# Patient Record
Sex: MALE | Race: WHITE | NOT HISPANIC OR LATINO
[De-identification: names, ages, dates, MRNs, and addresses within clinical notes are randomized per-mention and may not be internally consistent; named-entity substitution may affect disease eponyms.]

---

## 2017-10-16 ENCOUNTER — APPOINTMENT (OUTPATIENT)
Dept: COLORECTAL SURGERY | Facility: CLINIC | Age: 62
End: 2017-10-16
Payer: COMMERCIAL

## 2017-10-16 VITALS
WEIGHT: 245 LBS | HEIGHT: 73 IN | RESPIRATION RATE: 12 BRPM | SYSTOLIC BLOOD PRESSURE: 132 MMHG | HEART RATE: 62 BPM | DIASTOLIC BLOOD PRESSURE: 90 MMHG | BODY MASS INDEX: 32.47 KG/M2

## 2017-10-16 DIAGNOSIS — H18.602 KERATOCONUS, UNSPECIFIED, LEFT EYE: ICD-10-CM

## 2017-10-16 DIAGNOSIS — Z86.69 PERSONAL HISTORY OF OTHER DISEASES OF THE NERVOUS SYSTEM AND SENSE ORGANS: ICD-10-CM

## 2017-10-16 DIAGNOSIS — Z86.010 PERSONAL HISTORY OF COLONIC POLYPS: ICD-10-CM

## 2017-10-16 DIAGNOSIS — K57.32 DIVERTICULITIS OF LARGE INTESTINE W/OUT PERFORATION OR ABSCESS W/OUT BLEEDING: ICD-10-CM

## 2017-10-16 DIAGNOSIS — Z80.1 FAMILY HISTORY OF MALIGNANT NEOPLASM OF TRACHEA, BRONCHUS AND LUNG: ICD-10-CM

## 2017-10-16 DIAGNOSIS — Z78.9 OTHER SPECIFIED HEALTH STATUS: ICD-10-CM

## 2017-10-16 DIAGNOSIS — Z86.718 PERSONAL HISTORY OF OTHER VENOUS THROMBOSIS AND EMBOLISM: ICD-10-CM

## 2017-10-16 DIAGNOSIS — Z80.0 FAMILY HISTORY OF MALIGNANT NEOPLASM OF DIGESTIVE ORGANS: ICD-10-CM

## 2017-10-16 PROCEDURE — 99243 OFF/OP CNSLTJ NEW/EST LOW 30: CPT

## 2017-10-16 RX ORDER — MODAFINIL 100 MG/1
100 TABLET ORAL
Refills: 0 | Status: ACTIVE | COMMUNITY

## 2017-10-16 RX ORDER — IBUPROFEN AND FAMOTIDINE 800; 26.6 MG/1; MG/1
800-26.6 TABLET, COATED ORAL
Refills: 0 | Status: ACTIVE | COMMUNITY

## 2017-11-10 ENCOUNTER — OUTPATIENT (OUTPATIENT)
Dept: OUTPATIENT SERVICES | Facility: HOSPITAL | Age: 62
LOS: 1 days | End: 2017-11-10
Payer: COMMERCIAL

## 2017-11-10 VITALS
DIASTOLIC BLOOD PRESSURE: 89 MMHG | SYSTOLIC BLOOD PRESSURE: 138 MMHG | TEMPERATURE: 98 F | WEIGHT: 242.51 LBS | HEART RATE: 64 BPM | OXYGEN SATURATION: 99 % | RESPIRATION RATE: 14 BRPM | HEIGHT: 73 IN

## 2017-11-10 DIAGNOSIS — G47.33 OBSTRUCTIVE SLEEP APNEA (ADULT) (PEDIATRIC): ICD-10-CM

## 2017-11-10 DIAGNOSIS — K57.32 DIVERTICULITIS OF LARGE INTESTINE WITHOUT PERFORATION OR ABSCESS WITHOUT BLEEDING: ICD-10-CM

## 2017-11-10 DIAGNOSIS — Z94.7 CORNEAL TRANSPLANT STATUS: Chronic | ICD-10-CM

## 2017-11-10 DIAGNOSIS — S42.302A UNSPECIFIED FRACTURE OF SHAFT OF HUMERUS, LEFT ARM, INITIAL ENCOUNTER FOR CLOSED FRACTURE: Chronic | ICD-10-CM

## 2017-11-10 DIAGNOSIS — Z01.818 ENCOUNTER FOR OTHER PREPROCEDURAL EXAMINATION: ICD-10-CM

## 2017-11-10 LAB
ANION GAP SERPL CALC-SCNC: 16 MMOL/L — SIGNIFICANT CHANGE UP (ref 5–17)
BLD GP AB SCN SERPL QL: NEGATIVE — SIGNIFICANT CHANGE UP
BUN SERPL-MCNC: 13 MG/DL — SIGNIFICANT CHANGE UP (ref 7–23)
CALCIUM SERPL-MCNC: 9.7 MG/DL — SIGNIFICANT CHANGE UP (ref 8.4–10.5)
CHLORIDE SERPL-SCNC: 100 MMOL/L — SIGNIFICANT CHANGE UP (ref 96–108)
CO2 SERPL-SCNC: 24 MMOL/L — SIGNIFICANT CHANGE UP (ref 22–31)
CREAT SERPL-MCNC: 0.86 MG/DL — SIGNIFICANT CHANGE UP (ref 0.5–1.3)
GLUCOSE SERPL-MCNC: 91 MG/DL — SIGNIFICANT CHANGE UP (ref 70–99)
HBA1C BLD-MCNC: 5.5 % — SIGNIFICANT CHANGE UP (ref 4–5.6)
HCT VFR BLD CALC: 41.6 % — SIGNIFICANT CHANGE UP (ref 39–50)
HGB BLD-MCNC: 14.2 G/DL — SIGNIFICANT CHANGE UP (ref 13–17)
MCHC RBC-ENTMCNC: 31.7 PG — SIGNIFICANT CHANGE UP (ref 27–34)
MCHC RBC-ENTMCNC: 34.1 GM/DL — SIGNIFICANT CHANGE UP (ref 32–36)
MCV RBC AUTO: 92.9 FL — SIGNIFICANT CHANGE UP (ref 80–100)
PLATELET # BLD AUTO: 171 K/UL — SIGNIFICANT CHANGE UP (ref 150–400)
POTASSIUM SERPL-MCNC: 4.4 MMOL/L — SIGNIFICANT CHANGE UP (ref 3.5–5.3)
POTASSIUM SERPL-SCNC: 4.4 MMOL/L — SIGNIFICANT CHANGE UP (ref 3.5–5.3)
RBC # BLD: 4.48 M/UL — SIGNIFICANT CHANGE UP (ref 4.2–5.8)
RBC # FLD: 13.7 % — SIGNIFICANT CHANGE UP (ref 10.3–14.5)
RH IG SCN BLD-IMP: POSITIVE — SIGNIFICANT CHANGE UP
SODIUM SERPL-SCNC: 140 MMOL/L — SIGNIFICANT CHANGE UP (ref 135–145)
WBC # BLD: 5.7 K/UL — SIGNIFICANT CHANGE UP (ref 3.8–10.5)
WBC # FLD AUTO: 5.7 K/UL — SIGNIFICANT CHANGE UP (ref 3.8–10.5)

## 2017-11-10 PROCEDURE — 86901 BLOOD TYPING SEROLOGIC RH(D): CPT

## 2017-11-10 PROCEDURE — 86850 RBC ANTIBODY SCREEN: CPT

## 2017-11-10 PROCEDURE — 86900 BLOOD TYPING SEROLOGIC ABO: CPT

## 2017-11-10 PROCEDURE — G0463: CPT

## 2017-11-10 PROCEDURE — 80048 BASIC METABOLIC PNL TOTAL CA: CPT

## 2017-11-10 PROCEDURE — 85027 COMPLETE CBC AUTOMATED: CPT

## 2017-11-10 PROCEDURE — 87086 URINE CULTURE/COLONY COUNT: CPT

## 2017-11-10 PROCEDURE — 83036 HEMOGLOBIN GLYCOSYLATED A1C: CPT

## 2017-11-10 NOTE — H&P PST ADULT - PSH
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
## 2017-11-11 LAB
CULTURE RESULTS: NO GROWTH — SIGNIFICANT CHANGE UP
SPECIMEN SOURCE: SIGNIFICANT CHANGE UP

## 2017-11-14 ENCOUNTER — APPOINTMENT (OUTPATIENT)
Dept: COLORECTAL SURGERY | Facility: HOSPITAL | Age: 62
End: 2017-11-14

## 2017-11-16 ENCOUNTER — INPATIENT (INPATIENT)
Facility: HOSPITAL | Age: 62
LOS: 2 days | Discharge: HOME | DRG: 331 | End: 2017-11-19
Attending: SURGERY | Admitting: SURGERY
Payer: COMMERCIAL

## 2017-11-16 ENCOUNTER — TRANSCRIPTION ENCOUNTER (OUTPATIENT)
Age: 62
End: 2017-11-16

## 2017-11-16 ENCOUNTER — APPOINTMENT (OUTPATIENT)
Dept: COLORECTAL SURGERY | Facility: HOSPITAL | Age: 62
End: 2017-11-16
Payer: COMMERCIAL

## 2017-11-16 ENCOUNTER — RESULT REVIEW (OUTPATIENT)
Age: 62
End: 2017-11-16

## 2017-11-16 VITALS
RESPIRATION RATE: 18 BRPM | TEMPERATURE: 98 F | WEIGHT: 242.95 LBS | DIASTOLIC BLOOD PRESSURE: 82 MMHG | HEART RATE: 72 BPM | SYSTOLIC BLOOD PRESSURE: 116 MMHG | HEIGHT: 73 IN | OXYGEN SATURATION: 98 %

## 2017-11-16 DIAGNOSIS — Z01.818 ENCOUNTER FOR OTHER PREPROCEDURAL EXAMINATION: ICD-10-CM

## 2017-11-16 DIAGNOSIS — S42.302A UNSPECIFIED FRACTURE OF SHAFT OF HUMERUS, LEFT ARM, INITIAL ENCOUNTER FOR CLOSED FRACTURE: Chronic | ICD-10-CM

## 2017-11-16 DIAGNOSIS — Z94.7 CORNEAL TRANSPLANT STATUS: Chronic | ICD-10-CM

## 2017-11-16 LAB
GLUCOSE BLDC GLUCOMTR-MCNC: 91 MG/DL — SIGNIFICANT CHANGE UP (ref 70–99)
RH IG SCN BLD-IMP: POSITIVE — SIGNIFICANT CHANGE UP

## 2017-11-16 PROCEDURE — 88307 TISSUE EXAM BY PATHOLOGIST: CPT | Mod: 26

## 2017-11-16 PROCEDURE — 44207 L COLECTOMY/COLOPROCTOSTOMY: CPT

## 2017-11-16 PROCEDURE — 45300 PROCTOSIGMOIDOSCOPY DX: CPT

## 2017-11-16 RX ORDER — ACETAMINOPHEN 500 MG
1000 TABLET ORAL ONCE
Qty: 0 | Refills: 0 | Status: COMPLETED | OUTPATIENT
Start: 2017-11-16 | End: 2017-11-16

## 2017-11-16 RX ORDER — LIDOCAINE HCL 20 MG/ML
0.2 VIAL (ML) INJECTION ONCE
Qty: 0 | Refills: 0 | Status: DISCONTINUED | OUTPATIENT
Start: 2017-11-16 | End: 2017-11-16

## 2017-11-16 RX ORDER — ACETAMINOPHEN 500 MG
1000 TABLET ORAL ONCE
Qty: 0 | Refills: 0 | Status: COMPLETED | OUTPATIENT
Start: 2017-11-17 | End: 2017-11-17

## 2017-11-16 RX ORDER — ENOXAPARIN SODIUM 100 MG/ML
40 INJECTION SUBCUTANEOUS EVERY 24 HOURS
Qty: 0 | Refills: 0 | Status: DISCONTINUED | OUTPATIENT
Start: 2017-11-16 | End: 2017-11-19

## 2017-11-16 RX ORDER — INFLUENZA VIRUS VACCINE 15; 15; 15; 15 UG/.5ML; UG/.5ML; UG/.5ML; UG/.5ML
0.5 SUSPENSION INTRAMUSCULAR ONCE
Qty: 0 | Refills: 0 | Status: COMPLETED | OUTPATIENT
Start: 2017-11-16 | End: 2017-11-16

## 2017-11-16 RX ORDER — HEPARIN SODIUM 5000 [USP'U]/ML
5000 INJECTION INTRAVENOUS; SUBCUTANEOUS ONCE
Qty: 0 | Refills: 0 | Status: COMPLETED | OUTPATIENT
Start: 2017-11-16 | End: 2017-11-16

## 2017-11-16 RX ORDER — SODIUM CHLORIDE 9 MG/ML
1000 INJECTION, SOLUTION INTRAVENOUS
Qty: 0 | Refills: 0 | Status: DISCONTINUED | OUTPATIENT
Start: 2017-11-16 | End: 2017-11-17

## 2017-11-16 RX ORDER — SODIUM CHLORIDE 9 MG/ML
3 INJECTION INTRAMUSCULAR; INTRAVENOUS; SUBCUTANEOUS EVERY 8 HOURS
Qty: 0 | Refills: 0 | Status: DISCONTINUED | OUTPATIENT
Start: 2017-11-16 | End: 2017-11-16

## 2017-11-16 RX ORDER — ONDANSETRON 8 MG/1
4 TABLET, FILM COATED ORAL ONCE
Qty: 0 | Refills: 0 | Status: DISCONTINUED | OUTPATIENT
Start: 2017-11-16 | End: 2017-11-16

## 2017-11-16 RX ORDER — HYDROMORPHONE HYDROCHLORIDE 2 MG/ML
0.5 INJECTION INTRAMUSCULAR; INTRAVENOUS; SUBCUTANEOUS
Qty: 0 | Refills: 0 | Status: DISCONTINUED | OUTPATIENT
Start: 2017-11-16 | End: 2017-11-16

## 2017-11-16 RX ORDER — NALOXONE HYDROCHLORIDE 4 MG/.1ML
0.1 SPRAY NASAL
Qty: 0 | Refills: 0 | Status: DISCONTINUED | OUTPATIENT
Start: 2017-11-16 | End: 2017-11-19

## 2017-11-16 RX ORDER — MODAFINIL 200 MG/1
1 TABLET ORAL
Qty: 0 | Refills: 0 | COMMUNITY

## 2017-11-16 RX ORDER — HEPARIN SODIUM 5000 [USP'U]/ML
5000 INJECTION INTRAVENOUS; SUBCUTANEOUS ONCE
Qty: 0 | Refills: 0 | Status: DISCONTINUED | OUTPATIENT
Start: 2017-11-16 | End: 2017-11-16

## 2017-11-16 RX ORDER — ONDANSETRON 8 MG/1
4 TABLET, FILM COATED ORAL EVERY 6 HOURS
Qty: 0 | Refills: 0 | Status: DISCONTINUED | OUTPATIENT
Start: 2017-11-16 | End: 2017-11-19

## 2017-11-16 RX ORDER — HYDROMORPHONE HYDROCHLORIDE 2 MG/ML
0.5 INJECTION INTRAMUSCULAR; INTRAVENOUS; SUBCUTANEOUS
Qty: 0 | Refills: 0 | Status: DISCONTINUED | OUTPATIENT
Start: 2017-11-16 | End: 2017-11-19

## 2017-11-16 RX ORDER — HYDROMORPHONE HYDROCHLORIDE 2 MG/ML
30 INJECTION INTRAMUSCULAR; INTRAVENOUS; SUBCUTANEOUS
Qty: 0 | Refills: 0 | Status: DISCONTINUED | OUTPATIENT
Start: 2017-11-16 | End: 2017-11-19

## 2017-11-16 RX ADMIN — HYDROMORPHONE HYDROCHLORIDE 30 MILLILITER(S): 2 INJECTION INTRAMUSCULAR; INTRAVENOUS; SUBCUTANEOUS at 21:01

## 2017-11-16 RX ADMIN — HEPARIN SODIUM 5000 UNIT(S): 5000 INJECTION INTRAVENOUS; SUBCUTANEOUS at 06:05

## 2017-11-16 RX ADMIN — HYDROMORPHONE HYDROCHLORIDE 0.5 MILLIGRAM(S): 2 INJECTION INTRAMUSCULAR; INTRAVENOUS; SUBCUTANEOUS at 13:00

## 2017-11-16 RX ADMIN — HYDROMORPHONE HYDROCHLORIDE 30 MILLILITER(S): 2 INJECTION INTRAMUSCULAR; INTRAVENOUS; SUBCUTANEOUS at 16:08

## 2017-11-16 RX ADMIN — Medication 1000 MILLIGRAM(S): at 20:00

## 2017-11-16 RX ADMIN — HYDROMORPHONE HYDROCHLORIDE 30 MILLILITER(S): 2 INJECTION INTRAMUSCULAR; INTRAVENOUS; SUBCUTANEOUS at 14:42

## 2017-11-16 RX ADMIN — ENOXAPARIN SODIUM 40 MILLIGRAM(S): 100 INJECTION SUBCUTANEOUS at 21:29

## 2017-11-16 RX ADMIN — SODIUM CHLORIDE 100 MILLILITER(S): 9 INJECTION, SOLUTION INTRAVENOUS at 14:19

## 2017-11-16 RX ADMIN — Medication 400 MILLIGRAM(S): at 18:31

## 2017-11-16 RX ADMIN — SODIUM CHLORIDE 3 MILLILITER(S): 9 INJECTION INTRAMUSCULAR; INTRAVENOUS; SUBCUTANEOUS at 14:19

## 2017-11-16 RX ADMIN — HYDROMORPHONE HYDROCHLORIDE 0.5 MILLIGRAM(S): 2 INJECTION INTRAMUSCULAR; INTRAVENOUS; SUBCUTANEOUS at 12:45

## 2017-11-16 RX ADMIN — HYDROMORPHONE HYDROCHLORIDE 30 MILLILITER(S): 2 INJECTION INTRAMUSCULAR; INTRAVENOUS; SUBCUTANEOUS at 20:01
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
## 2017-11-16 NOTE — PATIENT PROFILE ADULT. - PSH
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

## 2017-11-16 NOTE — CHART NOTE - NSCHARTNOTEFT_GEN_A_CORE
S: Patient underwent Laparoscopic sigmoidectomy and tolerated procedure without  issue and sent to PACU.  Patient denies chest pain, shortness of breath, nausea, vomiting, lightheadedness, or dizziness.  Pain was well controlled with PCA.    O:    T(C): 36.6 (11-16-17 @ 16:00), Max: 36.9 (11-16-17 @ 12:25)  HR: 77 (11-16-17 @ 16:00) (52 - 77)  BP: 131/88 (11-16-17 @ 16:00) (110/73 - 141/87)  RR: 16 (11-16-17 @ 16:00) (12 - 16)  SpO2: 99% (11-16-17 @ 16:00) (97% - 100%)    Gen: NAD  Abd: Soft, nontender, nondistended. Dressings c/d/i. No palpable masses    Assessment/Plan:  62y Male s/p Laparoscopic sigmoidectomy    Pain control  Reg Diet  DVT PPX  Out of bed and encourage early ambulation  Incentive spirometry S: Patient underwent Laparoscopic sigmoidectomy and tolerated procedure without  issue and sent to PACU.  Patient denies chest pain, shortness of breath, nausea, vomiting, lightheadedness, or dizziness.  Pain was well controlled with PCA.    O:    T(C): 36.6 (11-16-17 @ 16:00), Max: 36.9 (11-16-17 @ 12:25)  HR: 77 (11-16-17 @ 16:00) (52 - 77)  BP: 131/88 (11-16-17 @ 16:00) (110/73 - 141/87)  RR: 16 (11-16-17 @ 16:00) (12 - 16)  SpO2: 99% (11-16-17 @ 16:00) (97% - 100%)    Gen: NAD  Abd: Soft, nontender, nondistended. Dressings c/d/i. No palpable masses    Assessment/Plan:  62y Male s/p Laparoscopic sigmoidectomy    Pain control  NPO  DVT PPX  Out of bed and encourage early ambulation  Incentive spirometry

## 2017-11-16 NOTE — BRIEF OPERATIVE NOTE - PROCEDURE
<<-----Click on this checkbox to enter Procedure Laparoscopic sigmoidectomy  11/16/2017    Active  [id number]

## 2017-11-17 LAB
ANION GAP SERPL CALC-SCNC: 12 MMOL/L — SIGNIFICANT CHANGE UP (ref 5–17)
APPEARANCE UR: ABNORMAL
BASOPHILS # BLD AUTO: 0.01 K/UL — SIGNIFICANT CHANGE UP (ref 0–0.2)
BASOPHILS NFR BLD AUTO: 0.1 % — SIGNIFICANT CHANGE UP (ref 0–2)
BILIRUB UR-MCNC: NEGATIVE — SIGNIFICANT CHANGE UP
BUN SERPL-MCNC: 10 MG/DL — SIGNIFICANT CHANGE UP (ref 7–23)
CALCIUM SERPL-MCNC: 8.4 MG/DL — SIGNIFICANT CHANGE UP (ref 8.4–10.5)
CHLORIDE SERPL-SCNC: 101 MMOL/L — SIGNIFICANT CHANGE UP (ref 96–108)
CO2 SERPL-SCNC: 25 MMOL/L — SIGNIFICANT CHANGE UP (ref 22–31)
COLOR SPEC: ABNORMAL
CREAT SERPL-MCNC: 0.79 MG/DL — SIGNIFICANT CHANGE UP (ref 0.5–1.3)
DIFF PNL FLD: ABNORMAL
EOSINOPHIL # BLD AUTO: 0 K/UL — SIGNIFICANT CHANGE UP (ref 0–0.5)
EOSINOPHIL NFR BLD AUTO: 0 % — SIGNIFICANT CHANGE UP (ref 0–6)
GLUCOSE SERPL-MCNC: 105 MG/DL — HIGH (ref 70–99)
GLUCOSE UR QL: NEGATIVE — SIGNIFICANT CHANGE UP
HCT VFR BLD CALC: 37.3 % — LOW (ref 39–50)
HGB BLD-MCNC: 12.2 G/DL — LOW (ref 13–17)
IMM GRANULOCYTES NFR BLD AUTO: 0.2 % — SIGNIFICANT CHANGE UP (ref 0–1.5)
KETONES UR-MCNC: ABNORMAL
LEUKOCYTE ESTERASE UR-ACNC: ABNORMAL
LYMPHOCYTES # BLD AUTO: 1.1 K/UL — SIGNIFICANT CHANGE UP (ref 1–3.3)
LYMPHOCYTES # BLD AUTO: 10.7 % — LOW (ref 13–44)
MCHC RBC-ENTMCNC: 30.4 PG — SIGNIFICANT CHANGE UP (ref 27–34)
MCHC RBC-ENTMCNC: 32.7 GM/DL — SIGNIFICANT CHANGE UP (ref 32–36)
MCV RBC AUTO: 93 FL — SIGNIFICANT CHANGE UP (ref 80–100)
MONOCYTES # BLD AUTO: 0.92 K/UL — HIGH (ref 0–0.9)
MONOCYTES NFR BLD AUTO: 8.9 % — SIGNIFICANT CHANGE UP (ref 2–14)
NEUTROPHILS # BLD AUTO: 8.24 K/UL — HIGH (ref 1.8–7.4)
NEUTROPHILS NFR BLD AUTO: 80.1 % — HIGH (ref 43–77)
NITRITE UR-MCNC: NEGATIVE — SIGNIFICANT CHANGE UP
PH UR: 6 — SIGNIFICANT CHANGE UP (ref 5–8)
PLATELET # BLD AUTO: 150 K/UL — SIGNIFICANT CHANGE UP (ref 150–400)
POTASSIUM SERPL-MCNC: 4.2 MMOL/L — SIGNIFICANT CHANGE UP (ref 3.5–5.3)
POTASSIUM SERPL-SCNC: 4.2 MMOL/L — SIGNIFICANT CHANGE UP (ref 3.5–5.3)
PROT UR-MCNC: 100 MG/DL
RBC # BLD: 4.01 M/UL — LOW (ref 4.2–5.8)
RBC # FLD: 13.7 % — SIGNIFICANT CHANGE UP (ref 10.3–14.5)
RBC CASTS # UR COMP ASSIST: >50 /HPF (ref 0–2)
SODIUM SERPL-SCNC: 138 MMOL/L — SIGNIFICANT CHANGE UP (ref 135–145)
SP GR SPEC: 1.02 — SIGNIFICANT CHANGE UP (ref 1.01–1.02)
UROBILINOGEN FLD QL: NEGATIVE — SIGNIFICANT CHANGE UP
WBC # BLD: 10.29 K/UL — SIGNIFICANT CHANGE UP (ref 3.8–10.5)
WBC # FLD AUTO: 10.29 K/UL — SIGNIFICANT CHANGE UP (ref 3.8–10.5)
WBC UR QL: ABNORMAL /HPF (ref 0–5)

## 2017-11-17 RX ORDER — DEXTROSE MONOHYDRATE, SODIUM CHLORIDE, AND POTASSIUM CHLORIDE 50; .745; 4.5 G/1000ML; G/1000ML; G/1000ML
1000 INJECTION, SOLUTION INTRAVENOUS
Qty: 0 | Refills: 0 | Status: DISCONTINUED | OUTPATIENT
Start: 2017-11-17 | End: 2017-11-19

## 2017-11-17 RX ORDER — SODIUM CHLORIDE 9 MG/ML
1000 INJECTION, SOLUTION INTRAVENOUS
Qty: 0 | Refills: 0 | Status: DISCONTINUED | OUTPATIENT
Start: 2017-11-17 | End: 2017-11-17

## 2017-11-17 RX ADMIN — Medication 400 MILLIGRAM(S): at 05:13

## 2017-11-17 RX ADMIN — Medication 400 MILLIGRAM(S): at 12:37

## 2017-11-17 RX ADMIN — SODIUM CHLORIDE 100 MILLILITER(S): 9 INJECTION, SOLUTION INTRAVENOUS at 05:14

## 2017-11-17 RX ADMIN — Medication 1000 MILLIGRAM(S): at 05:43

## 2017-11-17 RX ADMIN — HYDROMORPHONE HYDROCHLORIDE 30 MILLILITER(S): 2 INJECTION INTRAMUSCULAR; INTRAVENOUS; SUBCUTANEOUS at 07:29

## 2017-11-17 RX ADMIN — HYDROMORPHONE HYDROCHLORIDE 30 MILLILITER(S): 2 INJECTION INTRAMUSCULAR; INTRAVENOUS; SUBCUTANEOUS at 19:13

## 2017-11-17 RX ADMIN — ENOXAPARIN SODIUM 40 MILLIGRAM(S): 100 INJECTION SUBCUTANEOUS at 21:07

## 2017-11-17 RX ADMIN — Medication 1000 MILLIGRAM(S): at 12:37

## 2017-11-17 NOTE — PROGRESS NOTE ADULT - ASSESSMENT
Assessment:    62y Male who presents s/p lap sigmoidectomy     Plan:  -DVT PPX- Lovenox  -Pain control-PCA  -OOB as tolerated with assistance   -Advance diet as tolerated, with noted GI function   -Await/monitor Gi Fxn    -Wean supplemental oxygen   -remove ucath nabila jeffries am, AM   #[phone] 11-17-17 @ 06:54

## 2017-11-18 LAB
ANION GAP SERPL CALC-SCNC: 11 MMOL/L — SIGNIFICANT CHANGE UP (ref 5–17)
BUN SERPL-MCNC: 12 MG/DL — SIGNIFICANT CHANGE UP (ref 7–23)
CALCIUM SERPL-MCNC: 8.3 MG/DL — LOW (ref 8.4–10.5)
CHLORIDE SERPL-SCNC: 101 MMOL/L — SIGNIFICANT CHANGE UP (ref 96–108)
CO2 SERPL-SCNC: 26 MMOL/L — SIGNIFICANT CHANGE UP (ref 22–31)
CREAT SERPL-MCNC: 0.77 MG/DL — SIGNIFICANT CHANGE UP (ref 0.5–1.3)
CULTURE RESULTS: NO GROWTH — SIGNIFICANT CHANGE UP
GLUCOSE SERPL-MCNC: 111 MG/DL — HIGH (ref 70–99)
HCT VFR BLD CALC: 36 % — LOW (ref 39–50)
HGB BLD-MCNC: 11.9 G/DL — LOW (ref 13–17)
MAGNESIUM SERPL-MCNC: 2 MG/DL — SIGNIFICANT CHANGE UP (ref 1.6–2.6)
MCHC RBC-ENTMCNC: 31.2 PG — SIGNIFICANT CHANGE UP (ref 27–34)
MCHC RBC-ENTMCNC: 33.1 GM/DL — SIGNIFICANT CHANGE UP (ref 32–36)
MCV RBC AUTO: 94.2 FL — SIGNIFICANT CHANGE UP (ref 80–100)
PHOSPHATE SERPL-MCNC: 1.5 MG/DL — LOW (ref 2.5–4.5)
PLATELET # BLD AUTO: 141 K/UL — LOW (ref 150–400)
POTASSIUM SERPL-MCNC: 3.7 MMOL/L — SIGNIFICANT CHANGE UP (ref 3.5–5.3)
POTASSIUM SERPL-SCNC: 3.7 MMOL/L — SIGNIFICANT CHANGE UP (ref 3.5–5.3)
RBC # BLD: 3.82 M/UL — LOW (ref 4.2–5.8)
RBC # FLD: 13.8 % — SIGNIFICANT CHANGE UP (ref 10.3–14.5)
SODIUM SERPL-SCNC: 138 MMOL/L — SIGNIFICANT CHANGE UP (ref 135–145)
SPECIMEN SOURCE: SIGNIFICANT CHANGE UP
WBC # BLD: 9.48 K/UL — SIGNIFICANT CHANGE UP (ref 3.8–10.5)
WBC # FLD AUTO: 9.48 K/UL — SIGNIFICANT CHANGE UP (ref 3.8–10.5)

## 2017-11-18 RX ORDER — POTASSIUM CHLORIDE 20 MEQ
10 PACKET (EA) ORAL ONCE
Qty: 0 | Refills: 0 | Status: COMPLETED | OUTPATIENT
Start: 2017-11-18 | End: 2017-11-18

## 2017-11-18 RX ORDER — POTASSIUM PHOSPHATE, MONOBASIC POTASSIUM PHOSPHATE, DIBASIC 236; 224 MG/ML; MG/ML
15 INJECTION, SOLUTION INTRAVENOUS ONCE
Qty: 0 | Refills: 0 | Status: COMPLETED | OUTPATIENT
Start: 2017-11-18 | End: 2017-11-18

## 2017-11-18 RX ADMIN — DEXTROSE MONOHYDRATE, SODIUM CHLORIDE, AND POTASSIUM CHLORIDE 75 MILLILITER(S): 50; .745; 4.5 INJECTION, SOLUTION INTRAVENOUS at 15:53

## 2017-11-18 RX ADMIN — ENOXAPARIN SODIUM 40 MILLIGRAM(S): 100 INJECTION SUBCUTANEOUS at 21:02

## 2017-11-18 RX ADMIN — HYDROMORPHONE HYDROCHLORIDE 30 MILLILITER(S): 2 INJECTION INTRAMUSCULAR; INTRAVENOUS; SUBCUTANEOUS at 19:20

## 2017-11-18 RX ADMIN — POTASSIUM PHOSPHATE, MONOBASIC POTASSIUM PHOSPHATE, DIBASIC 62.5 MILLIMOLE(S): 236; 224 INJECTION, SOLUTION INTRAVENOUS at 17:53

## 2017-11-18 RX ADMIN — Medication 100 MILLIEQUIVALENT(S): at 15:49

## 2017-11-18 RX ADMIN — HYDROMORPHONE HYDROCHLORIDE 30 MILLILITER(S): 2 INJECTION INTRAMUSCULAR; INTRAVENOUS; SUBCUTANEOUS at 07:23

## 2017-11-18 NOTE — PROGRESS NOTE ADULT - ASSESSMENT
Assessment:    62y Male who presents s/p lap sigmoidectomy     Plan:  -DVT PPX- Lovenox  -Pain control-PCA  -OOB as tolerated with assistance   -Advance diet as tolerated, with noted GI function   -Await/monitor Gi Fxn    -Wean supplemental oxygen   -Jame GRANT'd, DTV @ 15:30

## 2017-11-19 ENCOUNTER — TRANSCRIPTION ENCOUNTER (OUTPATIENT)
Age: 62
End: 2017-11-19

## 2017-11-19 VITALS
RESPIRATION RATE: 18 BRPM | TEMPERATURE: 98 F | SYSTOLIC BLOOD PRESSURE: 120 MMHG | HEART RATE: 75 BPM | OXYGEN SATURATION: 98 % | DIASTOLIC BLOOD PRESSURE: 82 MMHG

## 2017-11-19 LAB
ANION GAP SERPL CALC-SCNC: 12 MMOL/L — SIGNIFICANT CHANGE UP (ref 5–17)
BUN SERPL-MCNC: 8 MG/DL — SIGNIFICANT CHANGE UP (ref 7–23)
CALCIUM SERPL-MCNC: 9 MG/DL — SIGNIFICANT CHANGE UP (ref 8.4–10.5)
CHLORIDE SERPL-SCNC: 103 MMOL/L — SIGNIFICANT CHANGE UP (ref 96–108)
CO2 SERPL-SCNC: 23 MMOL/L — SIGNIFICANT CHANGE UP (ref 22–31)
CREAT SERPL-MCNC: 0.88 MG/DL — SIGNIFICANT CHANGE UP (ref 0.5–1.3)
GLUCOSE SERPL-MCNC: 97 MG/DL — SIGNIFICANT CHANGE UP (ref 70–99)
HCT VFR BLD CALC: 36.7 % — LOW (ref 39–50)
HGB BLD-MCNC: 12.4 G/DL — LOW (ref 13–17)
MAGNESIUM SERPL-MCNC: 1.8 MG/DL — SIGNIFICANT CHANGE UP (ref 1.6–2.6)
MCHC RBC-ENTMCNC: 31.6 PG — SIGNIFICANT CHANGE UP (ref 27–34)
MCHC RBC-ENTMCNC: 33.8 GM/DL — SIGNIFICANT CHANGE UP (ref 32–36)
MCV RBC AUTO: 93.4 FL — SIGNIFICANT CHANGE UP (ref 80–100)
PHOSPHATE SERPL-MCNC: 2 MG/DL — LOW (ref 2.5–4.5)
PLATELET # BLD AUTO: 144 K/UL — LOW (ref 150–400)
POTASSIUM SERPL-MCNC: 3.8 MMOL/L — SIGNIFICANT CHANGE UP (ref 3.5–5.3)
POTASSIUM SERPL-SCNC: 3.8 MMOL/L — SIGNIFICANT CHANGE UP (ref 3.5–5.3)
RBC # BLD: 3.93 M/UL — LOW (ref 4.2–5.8)
RBC # FLD: 13.4 % — SIGNIFICANT CHANGE UP (ref 10.3–14.5)
SODIUM SERPL-SCNC: 138 MMOL/L — SIGNIFICANT CHANGE UP (ref 135–145)
WBC # BLD: 8.66 K/UL — SIGNIFICANT CHANGE UP (ref 3.8–10.5)
WBC # FLD AUTO: 8.66 K/UL — SIGNIFICANT CHANGE UP (ref 3.8–10.5)

## 2017-11-19 PROCEDURE — C1758: CPT

## 2017-11-19 PROCEDURE — 80048 BASIC METABOLIC PNL TOTAL CA: CPT

## 2017-11-19 PROCEDURE — 87086 URINE CULTURE/COLONY COUNT: CPT

## 2017-11-19 PROCEDURE — C1889: CPT

## 2017-11-19 PROCEDURE — 85027 COMPLETE CBC AUTOMATED: CPT

## 2017-11-19 PROCEDURE — 88307 TISSUE EXAM BY PATHOLOGIST: CPT

## 2017-11-19 PROCEDURE — 84100 ASSAY OF PHOSPHORUS: CPT

## 2017-11-19 PROCEDURE — 86901 BLOOD TYPING SEROLOGIC RH(D): CPT

## 2017-11-19 PROCEDURE — 81001 URINALYSIS AUTO W/SCOPE: CPT

## 2017-11-19 PROCEDURE — 86900 BLOOD TYPING SEROLOGIC ABO: CPT

## 2017-11-19 PROCEDURE — 82962 GLUCOSE BLOOD TEST: CPT

## 2017-11-19 PROCEDURE — 83735 ASSAY OF MAGNESIUM: CPT

## 2017-11-19 RX ORDER — SODIUM,POTASSIUM PHOSPHATES 278-250MG
1 POWDER IN PACKET (EA) ORAL
Qty: 8 | Refills: 0 | OUTPATIENT
Start: 2017-11-19 | End: 2017-11-21

## 2017-11-19 RX ORDER — MAGNESIUM SULFATE 500 MG/ML
2 VIAL (ML) INJECTION ONCE
Qty: 0 | Refills: 0 | Status: COMPLETED | OUTPATIENT
Start: 2017-11-19 | End: 2017-11-19

## 2017-11-19 RX ORDER — SODIUM CHLORIDE 9 MG/ML
3 INJECTION INTRAMUSCULAR; INTRAVENOUS; SUBCUTANEOUS EVERY 8 HOURS
Qty: 0 | Refills: 0 | Status: DISCONTINUED | OUTPATIENT
Start: 2017-11-19 | End: 2017-11-19

## 2017-11-19 RX ORDER — POTASSIUM PHOSPHATE, MONOBASIC POTASSIUM PHOSPHATE, DIBASIC 236; 224 MG/ML; MG/ML
15 INJECTION, SOLUTION INTRAVENOUS ONCE
Qty: 0 | Refills: 0 | Status: COMPLETED | OUTPATIENT
Start: 2017-11-19 | End: 2017-11-19

## 2017-11-19 RX ORDER — SODIUM,POTASSIUM PHOSPHATES 278-250MG
1 POWDER IN PACKET (EA) ORAL
Qty: 0 | Refills: 0 | Status: DISCONTINUED | OUTPATIENT
Start: 2017-11-19 | End: 2017-11-19

## 2017-11-19 RX ADMIN — SODIUM CHLORIDE 3 MILLILITER(S): 9 INJECTION INTRAMUSCULAR; INTRAVENOUS; SUBCUTANEOUS at 13:15

## 2017-11-19 RX ADMIN — DEXTROSE MONOHYDRATE, SODIUM CHLORIDE, AND POTASSIUM CHLORIDE 50 MILLILITER(S): 50; .745; 4.5 INJECTION, SOLUTION INTRAVENOUS at 05:50

## 2017-11-19 RX ADMIN — HYDROMORPHONE HYDROCHLORIDE 30 MILLILITER(S): 2 INJECTION INTRAMUSCULAR; INTRAVENOUS; SUBCUTANEOUS at 07:28

## 2017-11-19 RX ADMIN — Medication 1 TABLET(S): at 12:18

## 2017-11-19 RX ADMIN — Medication 50 GRAM(S): at 12:18

## 2017-11-19 RX ADMIN — POTASSIUM PHOSPHATE, MONOBASIC POTASSIUM PHOSPHATE, DIBASIC 62.5 MILLIMOLE(S): 236; 224 INJECTION, SOLUTION INTRAVENOUS at 13:25

## 2017-11-19 NOTE — DISCHARGE NOTE ADULT - HOSPITAL COURSE
Patient was admitted and underwent laparoscopic sigmoidectomy on 11-16-17. Patient was medically optimized and improved clinically throughout hospital stay. Patient seen and examined on day of discharge.    Vital Signs Last 24 Hrs  T(C): 36.9 (19 Nov 2017 13:00), Max: 37.2 (18 Nov 2017 22:20)  T(F): 98.5 (19 Nov 2017 13:00), Max: 98.9 (18 Nov 2017 22:20)  HR: 75 (19 Nov 2017 13:00) (62 - 86)  BP: 120/82 (19 Nov 2017 13:00) (107/75 - 138/89)  BP(mean): --  RR: 18 (19 Nov 2017 13:00) (18 - 18)  SpO2: 98% (19 Nov 2017 13:00) (94% - 98%)    Physical Exam:  General: Well developed, well nourished, No Acute Distress  HEENT: Normocephallic Atraumatic, EOMI bl  Neurology: AA&Ox3  Abdominal: Soft, Non-Tender, Non-Distended  Extremities: No Clubbing, cyanosis or edema  Skin: warm, dry, normal color, no rash or abnormal lesions    Patient is medically stable and cleared for discharge to home with outpatient follow up.

## 2017-11-19 NOTE — PROGRESS NOTE ADULT - SUBJECTIVE AND OBJECTIVE BOX
GENERAL SURGERY DAILY PROGRESS NOTE:     Subjective: Patient seen and examined. Patient stable with no overnight events. Patient denies CP/ SOB/ Palpatations. Patient denies N/V. Reports No flatus and No BM. Patient reports pain is well controlled     MEDICATIONS  (STANDING):  acetaminophen  IVPB. 1000 milliGRAM(s) IV Intermittent once  dextrose 5% + sodium chloride 0.45%. 1000 milliLiter(s) (75 mL/Hr) IV Continuous <Continuous>  enoxaparin Injectable 40 milliGRAM(s) SubCutaneous every 24 hours  HYDROmorphone PCA (1 mG/mL) 30 milliLiter(s) PCA Continuous PCA Continuous  influenza   Vaccine 0.5 milliLiter(s) IntraMuscular once    MEDICATIONS  (PRN):  HYDROmorphone PCA (1 mG/mL) Rescue Clinician Bolus 0.5 milliGRAM(s) IV Push every 15 minutes PRN for Pain Scale GREATER THAN 6  naloxone Injectable 0.1 milliGRAM(s) IV Push every 3 minutes PRN For ANY of the following changes in patient status:  A. RR LESS THAN 10 breaths per minute, B. Oxygen saturation LESS THAN 90%, C. Sedation score of 6  ondansetron Injectable 4 milliGRAM(s) IV Push every 6 hours PRN Nausea    Vital Signs Last 24 Hrs  T(C): 37.1 (17 Nov 2017 05:23), Max: 37.1 (17 Nov 2017 05:23)  T(F): 98.7 (17 Nov 2017 05:23), Max: 98.7 (17 Nov 2017 05:23)  HR: 67 (17 Nov 2017 05:23) (52 - 85)  BP: 115/76 (17 Nov 2017 05:23) (107/75 - 141/87)  BP(mean): 108 (16 Nov 2017 18:00) (87 - 108)  RR: 18 (17 Nov 2017 05:23) (12 - 18)  SpO2: 97% (17 Nov 2017 05:23) (94% - 100%)    I&O's Detail  16 Nov 2017 07:01  -  17 Nov 2017 06:54  --------------------------------------------------------  IN:    IV PiggyBack: 100 mL    lactated ringers.: 2000 mL  Total IN: 2100 mL  OUT:    Indwelling Catheter - Urethral: 2550 mL  Total OUT: 2550 mL  Total NET: -450 mL    LABS:  Pending       Urinalysis Basic - ( 17 Nov 2017 01:40 )  Color: x / Appearance: x / SG: x / pH: x  Gluc: x / Ketone: x  / Bili: x / Urobili: x   Blood: x / Protein: x / Nitrite: x   Leuk Esterase: x / RBC: >50 /HPF / WBC 5-10 /HPF   Sq Epi: x / Non Sq Epi: x / Bacteria: x    Physical Exam:   Gen: NAD, AAOx3  Abd: soft, Non- tender, non- distended   clean/ dry/ intact
Day 1 of Anesthesia Pain Management Service    SUBJECTIVE: Patient is doing well with IV PCA    Pain Scale Score:	[X] Refer to charted pain scores    THERAPY:    [ ] IV PCA Morphine		[ ] 5 mg/mL	[ ] 1 mg/mL  [X] IV PCA Hydromorphone	[ ] 5 mg/mL	[X] 1 mg/mL  [ ] IV PCA Fentanyl		[ ] 50 micrograms/mL    Demand dose: 0.2 mg     Lockout: 6 minutes   Continuous Rate: 0 mg/hr  4 Hour Limit: 4 mg    MEDICATIONS  (STANDING):  acetaminophen  IVPB. 1000 milliGRAM(s) IV Intermittent once  dextrose 5% + sodium chloride 0.45%. 1000 milliLiter(s) (75 mL/Hr) IV Continuous <Continuous>  enoxaparin Injectable 40 milliGRAM(s) SubCutaneous every 24 hours  HYDROmorphone PCA (1 mG/mL) 30 milliLiter(s) PCA Continuous PCA Continuous  influenza   Vaccine 0.5 milliLiter(s) IntraMuscular once    MEDICATIONS  (PRN):  HYDROmorphone PCA (1 mG/mL) Rescue Clinician Bolus 0.5 milliGRAM(s) IV Push every 15 minutes PRN for Pain Scale GREATER THAN 6  naloxone Injectable 0.1 milliGRAM(s) IV Push every 3 minutes PRN For ANY of the following changes in patient status:  A. RR LESS THAN 10 breaths per minute, B. Oxygen saturation LESS THAN 90%, C. Sedation score of 6  ondansetron Injectable 4 milliGRAM(s) IV Push every 6 hours PRN Nausea      OBJECTIVE:    Sedation Score:	[ X] Alert	[ ] Drowsy 	[ ] Arousable	[ ] Asleep	[ ] Unresponsive    Side Effects:	[X ] None	[ ] Nausea	[ ] Vomiting	[ ] Pruritus  		[ ] Other:    Vital Signs Last 24 Hrs  T(C): 36.8 (17 Nov 2017 08:58), Max: 37.1 (17 Nov 2017 05:23)  T(F): 98.2 (17 Nov 2017 08:58), Max: 98.7 (17 Nov 2017 05:23)  HR: 60 (17 Nov 2017 08:58) (52 - 85)  BP: 106/69 (17 Nov 2017 08:58) (106/69 - 141/87)  BP(mean): 108 (16 Nov 2017 18:00) (87 - 108)  RR: 18 (17 Nov 2017 08:58) (12 - 18)  SpO2: 96% (17 Nov 2017 08:58) (94% - 100%)    ASSESSMENT/ PLAN    Therapy to  be:               [X] Continued   [ ] Discontinued   [ ] Changed to PRN Analgesics    Documentation and Verification of current medications:   [X] Done	[ ] Not done, not eligible    Comments: Reeducated to use
Day _2__ of Anesthesia Pain Management Service    SUBJECTIVE:    Pain Scale Score	At rest: ___ 	With Activity: ___ 	[ x] Refer to charted pain scores    THERAPY:    [ ] IV PCA Morphine		[ ] 5 mg/mL	[ ] 1 mg/mL  [x ] IV PCA Hydromorphone	[ ] 5 mg/mL	[x ] 1 mg/mL  [ ] IV PCA Fentanyl		[ ] 50 micrograms/mL    Demand dose   0.2 lockout  6  (minutes)  0Continuous Rate         MEDICATIONS  (STANDING):  dextrose 5% + sodium chloride 0.45% with potassium chloride 20 mEq/L 1000 milliLiter(s) (75 mL/Hr) IV Continuous <Continuous>  enoxaparin Injectable 40 milliGRAM(s) SubCutaneous every 24 hours  HYDROmorphone PCA (1 mG/mL) 30 milliLiter(s) PCA Continuous PCA Continuous  influenza   Vaccine 0.5 milliLiter(s) IntraMuscular once    MEDICATIONS  (PRN):  HYDROmorphone PCA (1 mG/mL) Rescue Clinician Bolus 0.5 milliGRAM(s) IV Push every 15 minutes PRN for Pain Scale GREATER THAN 6  naloxone Injectable 0.1 milliGRAM(s) IV Push every 3 minutes PRN For ANY of the following changes in patient status:  A. RR LESS THAN 10 breaths per minute, B. Oxygen saturation LESS THAN 90%, C. Sedation score of 6  ondansetron Injectable 4 milliGRAM(s) IV Push every 6 hours PRN Nausea      OBJECTIVE:    Sedation Score:	[ x] Alert	[ ] Drowsy 	[ ] Arousable	[ ] Asleep	[ ] Unresponsive    Side Effects:	[x ] None	[ ] Nausea	[ ] Vomiting	[ ] Pruritus  		[ ] Other:    Vital Signs Last 24 Hrs  T(C): 36.7 (18 Nov 2017 08:52), Max: 37.3 (18 Nov 2017 01:22)  T(F): 98 (18 Nov 2017 08:52), Max: 99.1 (18 Nov 2017 01:22)  HR: 74 (18 Nov 2017 08:52) (69 - 76)  BP: 116/77 (18 Nov 2017 08:52) (98/65 - 120/79)  BP(mean): --  RR: 18 (18 Nov 2017 08:52) (18 - 18)  SpO2: 95% (18 Nov 2017 08:52) (93% - 97%)    ASSESSMENT/ PLAN    Therapy to  be:	[ x] Continue   [ ] Discontinued   [ ] Change to prn Analgesics    Documentation and Verification of current medications:   [X] Done	[ ] Not done, not elligible    Comments:I was physically present for the key portoins of the evaluation and management service provided, I agree with the above history and physical, and plan which I have reviewed and edited where appropriate
Day _3__ of Anesthesia Pain Management Service    SUBJECTIVE:    Pain Scale Score	At rest: ___ 	With Activity: ___ 	[ x] Refer to charted pain scores    THERAPY:    [ ] IV PCA Morphine		[ ] 5 mg/mL	[ ] 1 mg/mL  [x ] IV PCA Hydromorphone	[ ] 5 mg/mL	[x ] 1 mg/mL  [ ] IV PCA Fentanyl		[ ] 50 micrograms/mL    Demand dose   0.2 lockout  6  (minutes)  0Continuous Rate         MEDICATIONS  (STANDING):  enoxaparin Injectable 40 milliGRAM(s) SubCutaneous every 24 hours  influenza   Vaccine 0.5 milliLiter(s) IntraMuscular once  potassium acid phosphate/sodium acid phosphate tablet (K-PHOS No. 2) 1 Tablet(s) Oral four times a day with meals  potassium phosphate IVPB 15 milliMole(s) IV Intermittent once  sodium chloride 0.9% lock flush 3 milliLiter(s) IV Push every 8 hours    MEDICATIONS  (PRN):      OBJECTIVE:    Sedation Score:	[ x] Alert	[ ] Drowsy 	[ ] Arousable	[ ] Asleep	[ ] Unresponsive    Side Effects:	[x ] None	[ ] Nausea	[ ] Vomiting	[ ] Pruritus  		[ ] Other:    Vital Signs Last 24 Hrs  T(C): 37.1 (19 Nov 2017 09:03), Max: 37.2 (18 Nov 2017 22:20)  T(F): 98.7 (19 Nov 2017 09:03), Max: 98.9 (18 Nov 2017 22:20)  HR: 86 (19 Nov 2017 09:03) (62 - 86)  BP: 115/80 (19 Nov 2017 09:03) (107/75 - 138/89)  BP(mean): --  RR: 18 (19 Nov 2017 09:03) (18 - 18)  SpO2: 97% (19 Nov 2017 09:03) (94% - 97%)    ASSESSMENT/ PLAN    Therapy to  be:	[ ] Continue   [ ] Discontinued   [ ] Change to prn Analgesics    Documentation and Verification of current medications:   [X] Done	[ ] Not done, not elligible    Comments:I was physically present for the key portoins of the evaluation and management service provided, I agree with the above history and physical, and plan which I have reviewed and edited where appropriate
Red Team Progress Note. Please page #[phone] with any questions or concerns.    S: 63yo Man seen and examined during Red team morning rounds. No acute events overnight; afebrile, VS stable. Pain well controlled with current regimen. Tolerating diet; denies N/V. Reports passing flatus and having bowel movements.     O:  Vital Signs Last 24 Hrs  T(C): 36.7 (18 Nov 2017 08:52), Max: 37.3 (18 Nov 2017 01:22)  T(F): 98 (18 Nov 2017 08:52), Max: 99.1 (18 Nov 2017 01:22)  HR: 74 (18 Nov 2017 08:52) (69 - 76)  BP: 116/77 (18 Nov 2017 08:52) (98/65 - 120/79)  BP(mean): --  RR: 18 (18 Nov 2017 08:52) (18 - 18)  SpO2: 95% (18 Nov 2017 08:52) (93% - 97%)    I&O's Detail    17 Nov 2017 07:01  -  18 Nov 2017 07:00  --------------------------------------------------------  IN:    dextrose 5% + sodium chloride 0.45% with potassium chloride 20 mEq/L: 1750 mL  Total IN: 1750 mL    OUT:    Indwelling Catheter - Urethral: 1650 mL  Total OUT: 1650 mL    Total NET: 100 mL    Physical Exam:  Gen: Resting in bed, NAD, alert and oriented  Resp: airway patent, respirations unlabored, no increased WOB   Abd: soft, NT/ND, wound c/d/i    MEDICATIONS  (STANDING):  dextrose 5% + sodium chloride 0.45% with potassium chloride 20 mEq/L 1000 milliLiter(s) (75 mL/Hr) IV Continuous <Continuous>  enoxaparin Injectable 40 milliGRAM(s) SubCutaneous every 24 hours  HYDROmorphone PCA (1 mG/mL) 30 milliLiter(s) PCA Continuous PCA Continuous  influenza   Vaccine 0.5 milliLiter(s) IntraMuscular once    MEDICATIONS  (PRN):  HYDROmorphone PCA (1 mG/mL) Rescue Clinician Bolus 0.5 milliGRAM(s) IV Push every 15 minutes PRN for Pain Scale GREATER THAN 6  naloxone Injectable 0.1 milliGRAM(s) IV Push every 3 minutes PRN For ANY of the following changes in patient status:  A. RR LESS THAN 10 breaths per minute, B. Oxygen saturation LESS THAN 90%, C. Sedation score of 6  ondansetron Injectable 4 milliGRAM(s) IV Push every 6 hours PRN Nausea      LABS:                        11.9   9.48  )-----------( 141      ( 18 Nov 2017 08:34 )             36.0       11-18    138  |  101  |  12  ----------------------------<  111<H>  3.7   |  26  |  0.77    Ca    8.3<L>      18 Nov 2017 08:40  Phos  1.5     11-18  Mg     2.0     11-18      Negative 11-17-17 @ 01:04  --   11-17-17 @ 04:57   No growth
Red Team Progress Note. Please page #[phone] with any questions or concerns.    S: 63yo Man seen and examined during Red team morning rounds. No acute events overnight; afebrile, VS stable. Pain well controlled with current regimen. Tolerating diet; denies N/V. Reports passing flatus and having bowel movements.     O:  Vital Signs Last 24 Hrs  T(C): 36.9 (19 Nov 2017 01:35), Max: 37.2 (18 Nov 2017 22:20)  T(F): 98.5 (19 Nov 2017 01:35), Max: 98.9 (18 Nov 2017 22:20)  HR: 62 (19 Nov 2017 01:35) (62 - 80)  BP: 122/81 (19 Nov 2017 01:35) (107/75 - 122/81)  BP(mean): --  RR: 18 (19 Nov 2017 01:35) (18 - 18)  SpO2: 94% (19 Nov 2017 01:35) (93% - 96%)    I&O's Detail    17 Nov 2017 07:01  -  18 Nov 2017 07:00  --------------------------------------------------------  IN:    dextrose 5% + sodium chloride 0.45% with potassium chloride 20 mEq/L: 1750 mL  Total IN: 1750 mL    OUT:    Indwelling Catheter - Urethral: 1650 mL  Total OUT: 1650 mL    Total NET: 100 mL      18 Nov 2017 07:01  -  19 Nov 2017 05:01  --------------------------------------------------------  IN:    dextrose 5% + sodium chloride 0.45% with potassium chloride 20 mEq/L: 900 mL    IV PiggyBack: 350 mL  Total IN: 1250 mL    OUT:    Indwelling Catheter - Urethral: 600 mL    Voided: 2450 mL  Total OUT: 3050 mL    Total NET: -1800 mL          Physical Exam:  Gen: Resting in bed, NAD, alert and oriented  Resp: airway patent, respirations unlabored, no increased WOB   Abd: soft, NT/ND    MEDICATIONS  (STANDING):  dextrose 5% + sodium chloride 0.45% with potassium chloride 20 mEq/L 1000 milliLiter(s) (50 mL/Hr) IV Continuous <Continuous>  enoxaparin Injectable 40 milliGRAM(s) SubCutaneous every 24 hours  HYDROmorphone PCA (1 mG/mL) 30 milliLiter(s) PCA Continuous PCA Continuous  influenza   Vaccine 0.5 milliLiter(s) IntraMuscular once    MEDICATIONS  (PRN):  HYDROmorphone PCA (1 mG/mL) Rescue Clinician Bolus 0.5 milliGRAM(s) IV Push every 15 minutes PRN for Pain Scale GREATER THAN 6  naloxone Injectable 0.1 milliGRAM(s) IV Push every 3 minutes PRN For ANY of the following changes in patient status:  A. RR LESS THAN 10 breaths per minute, B. Oxygen saturation LESS THAN 90%, C. Sedation score of 6  ondansetron Injectable 4 milliGRAM(s) IV Push every 6 hours PRN Nausea      LABS:                        11.9   9.48  )-----------( 141      ( 18 Nov 2017 08:34 )             36.0       11-18    138  |  101  |  12  ----------------------------<  111<H>  3.7   |  26  |  0.77    Ca    8.3<L>      18 Nov 2017 08:40  Phos  1.5     11-18  Mg     2.0     11-18      Negative 11-17-17 @ 01:04      --   11-17-17 @ 04:57   No growth
Pain Management Attending Addendum    SUBJECTIVE: Patient doing well with PCEA    Therapy:    [X] PCEA    OBJECTIVE:   [X] Pain appropriately controlled    [ ] Other:    Side Effects:  [X] None	             [ ] Nausea              [ ] Pruritis        [ ] Weakness          [ ] Numbness        	[ ] Other:    ASSESSMENT/PLAN:    [X] Continue current therapy    [ ] Therapy changed to:    [ ] PRN Analgesics   [ ] IV PCA    Comments:

## 2017-11-19 NOTE — DISCHARGE NOTE ADULT - MEDICATION SUMMARY - MEDICATIONS TO TAKE
I will START or STAY ON the medications listed below when I get home from the hospital:    supplement: OPE-3 antioxidant  -- Indication: For continue taking as previously indicated    Provigil 100 mg oral tablet  -- 1 tab(s) by mouth once a day (in the morning)  -- Indication: For continue taking as previously indicated    potassium phosphate-sodium phosphate 305 mg-700 mg oral tablet  -- 1 tab(s) by mouth 4 times a day (with meals and at bedtime)  -- Indication: For hypophosphatemia

## 2017-11-19 NOTE — DISCHARGE NOTE ADULT - PATIENT PORTAL LINK FT
“You can access the [facility] Patient Portal, offered by [facility], by registering with the following website: [email or web address]”

## 2017-11-19 NOTE — PROGRESS NOTE ADULT - ASSESSMENT
Assessment:    62y Male who presents s/p lap sigmoidectomy on POD 3.    Plan:  -DVT PPX- Lovenox  -OOB as tolerated with assistance   -Await/monitor Gi Fxn      -Diet: advance to LRD  -Pain control w/ PO meds, DC the PCA  -DC supplemental oxygen   -DC home tonight or tomorrow AM Assessment:    62y Male who presents s/p lap sigmoidectomy on POD 3.    Plan:  -DVT PPX- Lovenox  -OOB as tolerated with assistance   -Await/monitor Gi Fxn    -Diet: advance to LRD  -Pain control w/ PO meds, DC the PCA  -DC supplemental oxygen   -DC home today

## 2017-11-19 NOTE — DISCHARGE NOTE ADULT - CARE PLAN
Principal Discharge DX:	Diverticulitis of large intestine without perforation or abscess without bleeding  Goal:	return to activities of daily living  Instructions for follow-up, activity and diet:	Activity: Resume activities of daily living. Do not lift heavy weights (greater than 15 pounds) or engage in strenuous exercise until you see your doctor in the office in 1-2 weeks. You may shower tomorrow, just let the water run over the wound, no scrubbing. No immersion baths or swimming in pools or ocean until you see us in the office again.  Diet: Resume your regular diet  Follow up: Please make an appointment with Dr. Birmingham in 1-2 weeks. Please find contact information on this page.

## 2017-11-19 NOTE — DISCHARGE NOTE ADULT - CARE PROVIDER_API CALL
Wisam Birmingham), ColonRectal Surgery; Surgery  [address]  [address]  [place] [address]  Phone: [phone]  Fax: [phone]

## 2017-11-19 NOTE — DISCHARGE NOTE ADULT - PLAN OF CARE
return to activities of daily living Activity: Resume activities of daily living. Do not lift heavy weights (greater than 15 pounds) or engage in strenuous exercise until you see your doctor in the office in 1-2 weeks. You may shower tomorrow, just let the water run over the wound, no scrubbing. No immersion baths or swimming in pools or ocean until you see us in the office again.  Diet: Resume your regular diet  Follow up: Please make an appointment with Dr. Birmingham in 1-2 weeks. Please find contact information on this page.

## 2017-11-22 LAB — SURGICAL PATHOLOGY STUDY: SIGNIFICANT CHANGE UP

## 2017-11-29 ENCOUNTER — APPOINTMENT (OUTPATIENT)
Dept: COLORECTAL SURGERY | Facility: CLINIC | Age: 62
End: 2017-11-29
Payer: COMMERCIAL

## 2017-11-29 PROCEDURE — 99024 POSTOP FOLLOW-UP VISIT: CPT

## 2017-12-13 ENCOUNTER — APPOINTMENT (OUTPATIENT)
Dept: COLORECTAL SURGERY | Facility: CLINIC | Age: 62
End: 2017-12-13
Payer: COMMERCIAL

## 2017-12-13 PROCEDURE — 99024 POSTOP FOLLOW-UP VISIT: CPT

## 2018-01-08 ENCOUNTER — APPOINTMENT (OUTPATIENT)
Dept: COLORECTAL SURGERY | Facility: CLINIC | Age: 63
End: 2018-01-08
Payer: COMMERCIAL

## 2018-01-08 PROCEDURE — 45330 DIAGNOSTIC SIGMOIDOSCOPY: CPT | Mod: 58

## 2018-01-08 RX ORDER — NEOMYCIN SULFATE 500 MG/1
500 TABLET ORAL
Qty: 6 | Refills: 0 | Status: DISCONTINUED | COMMUNITY
Start: 2017-10-17 | End: 2018-01-08

## 2018-01-08 RX ORDER — METRONIDAZOLE 500 MG/1
500 TABLET ORAL
Qty: 3 | Refills: 0 | Status: DISCONTINUED | COMMUNITY
Start: 2017-10-17 | End: 2018-01-08

## 2018-07-23 PROBLEM — Z78.9 ALCOHOL USE: Status: ACTIVE | Noted: 2017-10-16

## 2019-03-20 ENCOUNTER — INPATIENT (INPATIENT)
Facility: HOSPITAL | Age: 64
LOS: 5 days | Discharge: HOME | DRG: 511 | End: 2019-03-26
Attending: SURGERY | Admitting: SURGERY
Payer: COMMERCIAL

## 2019-03-20 ENCOUNTER — TRANSCRIPTION ENCOUNTER (OUTPATIENT)
Age: 64
End: 2019-03-20

## 2019-03-20 VITALS
HEART RATE: 80 BPM | SYSTOLIC BLOOD PRESSURE: 148 MMHG | DIASTOLIC BLOOD PRESSURE: 100 MMHG | OXYGEN SATURATION: 98 % | RESPIRATION RATE: 16 BRPM

## 2019-03-20 DIAGNOSIS — S42.302A UNSPECIFIED FRACTURE OF SHAFT OF HUMERUS, LEFT ARM, INITIAL ENCOUNTER FOR CLOSED FRACTURE: Chronic | ICD-10-CM

## 2019-03-20 DIAGNOSIS — Z94.7 CORNEAL TRANSPLANT STATUS: Chronic | ICD-10-CM

## 2019-03-20 LAB
ALBUMIN SERPL ELPH-MCNC: 4 G/DL — SIGNIFICANT CHANGE UP (ref 3.3–5)
ALP SERPL-CCNC: 71 U/L — SIGNIFICANT CHANGE UP (ref 40–120)
ALT FLD-CCNC: 104 U/L — HIGH (ref 10–45)
ANION GAP SERPL CALC-SCNC: 14 MMOL/L — SIGNIFICANT CHANGE UP (ref 5–17)
APTT BLD: 24.6 SEC — LOW (ref 27.5–36.3)
AST SERPL-CCNC: 123 U/L — HIGH (ref 10–40)
BASOPHILS # BLD AUTO: 0.1 K/UL — SIGNIFICANT CHANGE UP (ref 0–0.2)
BASOPHILS NFR BLD AUTO: 0.8 % — SIGNIFICANT CHANGE UP (ref 0–2)
BILIRUB SERPL-MCNC: 0.3 MG/DL — SIGNIFICANT CHANGE UP (ref 0.2–1.2)
BUN SERPL-MCNC: 21 MG/DL — SIGNIFICANT CHANGE UP (ref 7–23)
CALCIUM SERPL-MCNC: 8.9 MG/DL — SIGNIFICANT CHANGE UP (ref 8.4–10.5)
CHLORIDE SERPL-SCNC: 103 MMOL/L — SIGNIFICANT CHANGE UP (ref 96–108)
CK SERPL-CCNC: 170 U/L — SIGNIFICANT CHANGE UP (ref 30–200)
CO2 SERPL-SCNC: 24 MMOL/L — SIGNIFICANT CHANGE UP (ref 22–31)
CREAT SERPL-MCNC: 1.02 MG/DL — SIGNIFICANT CHANGE UP (ref 0.5–1.3)
EOSINOPHIL # BLD AUTO: 0.3 K/UL — SIGNIFICANT CHANGE UP (ref 0–0.5)
EOSINOPHIL NFR BLD AUTO: 3.4 % — SIGNIFICANT CHANGE UP (ref 0–6)
ETHANOL SERPL-MCNC: SIGNIFICANT CHANGE UP MG/DL (ref 0–10)
GAS PNL BLDV: SIGNIFICANT CHANGE UP
GLUCOSE SERPL-MCNC: 158 MG/DL — HIGH (ref 70–99)
HCT VFR BLD CALC: 40.3 % — SIGNIFICANT CHANGE UP (ref 39–50)
HGB BLD-MCNC: 13.3 G/DL — SIGNIFICANT CHANGE UP (ref 13–17)
INR BLD: 0.97 RATIO — SIGNIFICANT CHANGE UP (ref 0.88–1.16)
LIDOCAIN IGE QN: 23 U/L — SIGNIFICANT CHANGE UP (ref 7–60)
LYMPHOCYTES # BLD AUTO: 4.9 K/UL — HIGH (ref 1–3.3)
LYMPHOCYTES # BLD AUTO: 49.3 % — HIGH (ref 13–44)
MCHC RBC-ENTMCNC: 30.9 PG — SIGNIFICANT CHANGE UP (ref 27–34)
MCHC RBC-ENTMCNC: 33 GM/DL — SIGNIFICANT CHANGE UP (ref 32–36)
MCV RBC AUTO: 93.7 FL — SIGNIFICANT CHANGE UP (ref 80–100)
MONOCYTES # BLD AUTO: 0.8 K/UL — SIGNIFICANT CHANGE UP (ref 0–0.9)
MONOCYTES NFR BLD AUTO: 7.9 % — SIGNIFICANT CHANGE UP (ref 2–14)
NEUTROPHILS # BLD AUTO: 3.8 K/UL — SIGNIFICANT CHANGE UP (ref 1.8–7.4)
NEUTROPHILS NFR BLD AUTO: 38.6 % — LOW (ref 43–77)
PLATELET # BLD AUTO: 160 K/UL — SIGNIFICANT CHANGE UP (ref 150–400)
POTASSIUM SERPL-MCNC: 3.4 MMOL/L — LOW (ref 3.5–5.3)
POTASSIUM SERPL-SCNC: 3.4 MMOL/L — LOW (ref 3.5–5.3)
PROT SERPL-MCNC: 6.8 G/DL — SIGNIFICANT CHANGE UP (ref 6–8.3)
PROTHROM AB SERPL-ACNC: 11.2 SEC — SIGNIFICANT CHANGE UP (ref 10–12.9)
RBC # BLD: 4.3 M/UL — SIGNIFICANT CHANGE UP (ref 4.2–5.8)
RBC # FLD: 12.9 % — SIGNIFICANT CHANGE UP (ref 10.3–14.5)
SODIUM SERPL-SCNC: 141 MMOL/L — SIGNIFICANT CHANGE UP (ref 135–145)
WBC # BLD: 9.9 K/UL — SIGNIFICANT CHANGE UP (ref 3.8–10.5)
WBC # FLD AUTO: 9.9 K/UL — SIGNIFICANT CHANGE UP (ref 3.8–10.5)

## 2019-03-20 PROCEDURE — 70450 CT HEAD/BRAIN W/O DYE: CPT | Mod: 26

## 2019-03-20 PROCEDURE — 71260 CT THORAX DX C+: CPT | Mod: 26

## 2019-03-20 PROCEDURE — 74177 CT ABD & PELVIS W/CONTRAST: CPT | Mod: 26

## 2019-03-20 PROCEDURE — 76377 3D RENDER W/INTRP POSTPROCES: CPT | Mod: 26

## 2019-03-20 PROCEDURE — 72170 X-RAY EXAM OF PELVIS: CPT | Mod: 26

## 2019-03-20 PROCEDURE — 99285 EMERGENCY DEPT VISIT HI MDM: CPT | Mod: 25

## 2019-03-20 PROCEDURE — 71045 X-RAY EXAM CHEST 1 VIEW: CPT | Mod: 26

## 2019-03-20 PROCEDURE — 70486 CT MAXILLOFACIAL W/O DYE: CPT | Mod: 26

## 2019-03-20 PROCEDURE — 72125 CT NECK SPINE W/O DYE: CPT | Mod: 26

## 2019-03-20 RX ORDER — FENTANYL CITRATE 50 UG/ML
25 INJECTION INTRAVENOUS ONCE
Qty: 0 | Refills: 0 | Status: DISCONTINUED | OUTPATIENT
Start: 2019-03-20 | End: 2019-03-20

## 2019-03-20 RX ORDER — TETANUS TOXOID, REDUCED DIPHTHERIA TOXOID AND ACELLULAR PERTUSSIS VACCINE, ADSORBED 5; 2.5; 8; 8; 2.5 [IU]/.5ML; [IU]/.5ML; UG/.5ML; UG/.5ML; UG/.5ML
0.5 SUSPENSION INTRAMUSCULAR ONCE
Qty: 0 | Refills: 0 | Status: COMPLETED | OUTPATIENT
Start: 2019-03-20 | End: 2019-03-20

## 2019-03-20 RX ORDER — SODIUM CHLORIDE 9 MG/ML
1000 INJECTION INTRAMUSCULAR; INTRAVENOUS; SUBCUTANEOUS ONCE
Qty: 0 | Refills: 0 | Status: COMPLETED | OUTPATIENT
Start: 2019-03-20 | End: 2019-03-20

## 2019-03-20 RX ADMIN — SODIUM CHLORIDE 1000 MILLILITER(S): 9 INJECTION INTRAMUSCULAR; INTRAVENOUS; SUBCUTANEOUS at 22:51

## 2019-03-20 RX ADMIN — TETANUS TOXOID, REDUCED DIPHTHERIA TOXOID AND ACELLULAR PERTUSSIS VACCINE, ADSORBED 0.5 MILLILITER(S): 5; 2.5; 8; 8; 2.5 SUSPENSION INTRAMUSCULAR at 22:50

## 2019-03-20 RX ADMIN — FENTANYL CITRATE 25 MICROGRAM(S): 50 INJECTION INTRAVENOUS at 22:34
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
## 2019-03-20 NOTE — CONSULT NOTE ADULT - SUBJECTIVE AND OBJECTIVE BOX
62yo m pmh dvt no longer on any AC bibems from home after fall from roof 20 ft. 63 year old male presenting as Level II trauma activation following fall backwards off a roof. Patient reports he was working on his friend's roof, trying to address a leak when he fell off the roof, approximately 20 feet, and seems to have struck his face on the bottom of the ladder, however, he had (+) LOC and is uncertain of the details of the event. He was brought in by EMS with bilateral upper extremity splints and c-collar in place. Patient is reporting pain in both arms and his chest. Patient scheduled for Open reduction and internal fixation of  Patient is a 63y old  Male who presents with a chief complaint of Level II - fall off roof (21 Mar 2019 13:41)      PAST MEDICAL & SURGICAL HISTORY:  Diverticulitis of large intestine without perforation or abscess without bleeding  DVT (deep venous thrombosis): LLE 2012, treated with coumadin x 6 months.  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 650 milliGRAM(s) Oral every 6 hours  ceFAZolin   IVPB 2000 milliGRAM(s) IV Intermittent every 8 hours  docusate sodium 100 milliGRAM(s) Oral three times a day  enoxaparin Injectable 40 milliGRAM(s) SubCutaneous every 24 hours  gabapentin 100 milliGRAM(s) Oral three times a day  ketorolac   Injectable 15 milliGRAM(s) IV Push every 6 hours  lidocaine   Patch 1 Patch Transdermal daily  lidocaine   Patch 1 Patch Transdermal daily  sodium chloride 0.9%. 1000 milliLiter(s) (125 mL/Hr) IV Continuous <Continuous>    MEDICATIONS  (PRN):  HYDROmorphone  Injectable 1 milliGRAM(s) IV Push every 6 hours PRN breakthrough  magnesium hydroxide Suspension 30 milliLiter(s) Oral daily PRN Constipation  oxyCODONE    IR 10 milliGRAM(s) Oral every 4 hours PRN Severe Pain (7 - 10)  oxyCODONE    IR 5 milliGRAM(s) Oral every 4 hours PRN Moderate Pain (4 - 6)    Social Hx:  Tobacco:  ETOH:  Drugs:      CONSTITUTIONAL: No weakness, fevers or chills  EYES/ENT: No visual changes;  No vertigo or throat pain   NECK: No pain or stiffness  RESPIRATORY: No cough, wheezing, hemoptysis; No shortness of breath  CARDIOVASCULAR: No chest pain or palpitations  GASTROINTESTINAL: No abdominal or epigastric pain. No nausea, vomiting, or hematemesis; No diarrhea or constipation. No melena or hematochezia.  GENITOURINARY: No dysuria, frequency or hematuria  NEUROLOGICAL: No numbness or weakness  SKIN: No itching, burning, rashes, or lesions   MUSCULOSKELETAL: Pain     INTERVAL HPI/OVERNIGHT EVENTS:  T(C): 37.2 (03-21-19 @ 18:00), Max: 37.4 (03-21-19 @ 06:14)  HR: 82 (03-21-19 @ 18:00) (73 - 89)  BP: 170/90 (03-21-19 @ 18:00) (112/73 - 170/90)  RR: 18 (03-21-19 @ 18:00) (16 - 18)  SpO2: 97% (03-21-19 @ 18:00) (92% - 100%)  Wt(kg): --  I&O's Summary    21 Mar 2019 07:01  -  21 Mar 2019 18:32  --------------------------------------------------------  IN: 375 mL / OUT: 350 mL / NET: 25 mL        PHYSICAL EXAM:  GENERAL: NAD, well-groomed, well-developed  HEAD: + facial contusions and abrasions  EYES: EOMI, PERRLA, conjunctiva and sclera clear  ENMT: No tonsillar erythema, exudates, or enlargement; Moist mucous membranes, Good dentition, No lesions  NECK: Supple, No JVD, Normal thyroid  NERVOUS SYSTEM:  Alert & Oriented X3, Good concentration; Motor Strength 5/5 B/L upper and lower extremities; DTRs 2+ intact and symmetric  CHEST/LUNG: Clear to percussion bilaterally; No rales, rhonchi, wheezing, or rubs  HEART: Regular rate and rhythm; No murmurs, rubs, or gallops  ABDOMEN: Soft, Nontender, Nondistended; Bowel sounds present  EXTREMITIES:  b/l UE fractures in splints  LYMPH: No lymphadenopathy noted  SKIN: No rashes or lesions        LABS:                        12.3   9.6   )-----------( 121      ( 21 Mar 2019 14:04 )             37.0     03-21    140  |  102  |  24<H>  ----------------------------<  120<H>  4.0   |  25  |  0.94    Ca    8.7      21 Mar 2019 14:04    TPro  6.8  /  Alb  4.0  /  TBili  0.3  /  DBili  x   /  AST  123<H>  /  ALT  104<H>  /  AlkPhos  71  03-20    PT/INR - ( 20 Mar 2019 22:48 )   PT: 11.2 sec;   INR: 0.97 ratio         PTT - ( 20 Mar 2019 22:48 )  PTT:24.6 sec    CAPILLARY BLOOD GLUCOSE  EKG pending

## 2019-03-20 NOTE — CONSULT NOTE ADULT - ATTENDING COMMENTS
I am a non participating [employer] physician seeing Pt in coverage for Dr Zarate I am a non participating [facility] physician seeing Pt in coverage for Dr Zarate. The above patient examination was reviewed with Dr. Wu and I agree with his evaluation, assessment and treatment plan.  Mark Zarate M.D.

## 2019-03-20 NOTE — ED PROVIDER NOTE - OBJECTIVE STATEMENT
64yo m pmh dvt no longer on any AC bibems from home after fall from roof 20 ft. 62yo m pmh dvt no longer on any AC bibems from home after fall from roof 20 ft. 63 year old male presenting as Level II trauma activation following fall backwards off a roof. Patient reports he was working on his friend's roof, trying to address a leak when he fell off the roof, approximately 20 feet, and seems to have struck his face on the bottom of the ladder, however, he had (+) LOC and is uncertain of the details of the event. He was brought in by EMS with bilateral upper extremity splints and c-collar in place. Patient is reporting pain in both arms and his ches

## 2019-03-20 NOTE — CONSULT NOTE ADULT - PROBLEM SELECTOR RECOMMENDATION 9
scheduled for Open reduction and internal fixation  no contraindication to scheduled procedure  further treatment as per trauma

## 2019-03-20 NOTE — ED PROVIDER NOTE - NS ED ROS FT
Review of Systems:  	•	CONSTITUTIONAL: no fever  	  	•	RESPIRATORY: no shortness of breath  	•	CARDIAC: + chest pain, no palpitations  	•	GI:  no abd pain, no nausea, no vomiting, no diarrhea  	•	GENITO-URINARY:  no dysuria; no hematuria    	•	MUSCULOSKELETAL:  + back pain, b/l UE pain  	•

## 2019-03-20 NOTE — ED PROVIDER NOTE - CARE PLAN
Principal Discharge DX:	Rib fractures  Secondary Diagnosis:	Radius fracture  Secondary Diagnosis:	Maxillary fracture Principal Discharge DX:	Other closed intra-articular fracture of distal end of left radius, initial encounter  Secondary Diagnosis:	Radius fracture  Secondary Diagnosis:	Maxillary fracture  Secondary Diagnosis:	Closed fracture of multiple ribs of both sides, initial encounter

## 2019-03-20 NOTE — ED PROVIDER NOTE - PHYSICAL EXAMINATION
General: well developed, well groomed, NAD  	Neuro: alert and oriented, no focal deficits, moves all extremities spontaneously  	HEENT: marked swelling over R face, nontender, PERRL (2mm), EOMI, mucosa moist  	Respiratory: airway patent, respirations unlabored  	Back: no step off, deformity or discoloration  	CVS: regular rate and rhythm  	Chest: tender on R anterior chest, no deformity  	Abdomen: soft, nontender, nondistended  	Pelvis/: intact, stable, grossly normal tone  	Extremities: bilateral distal upper extremity deformities, with intact skin; sensation and movement grossly intact; lower extremities without angulation, discoloration or deformity  	Vascular: bilateral palpable radial and DP pulses  Skin: warm, dry, appropriate color

## 2019-03-20 NOTE — ED PROVIDER NOTE - PROGRESS NOTE DETAILS
latonya: gave pt results of imaging. ortho consulted. trauma will call plastic surgery. incentive spirometer [id number]

## 2019-03-20 NOTE — ED PROVIDER NOTE - ATTENDING CONTRIBUTION TO CARE
****ATTENDING**** 64yo male s/p fall from 20ft presents with head injury, B/L shoulder pain and lower back pain. Level 2 trauma called pre arrival. On exam, Pt w R periorbital ecchymosis, GCS 15. B/L shoulder tenderness. Abd soft nd/nt +BS. Moving all 4 extremities.   labs, CT/xray to eval for inj.

## 2019-03-20 NOTE — ED PROVIDER NOTE - CLINICAL SUMMARY MEDICAL DECISION MAKING FREE TEXT BOX
63 year old male s/p fall from 20 feet, hemodynamically stable, with multiple facial fracture, bilateral distal radius fractures, and bilateral rib fractures (R 5-9 and L 4-6). adm trauma. ophtho, ortho, plastics consulted.

## 2019-03-20 NOTE — ED PROVIDER NOTE - SECONDARY DIAGNOSIS.
Radius fracture Maxillary fracture Closed fracture of multiple ribs of both sides, initial encounter

## 2019-03-20 NOTE — ED ADULT NURSE NOTE - NSIMPLEMENTINTERV_GEN_ALL_ED
Implemented All Fall Risk Interventions:  [place] to call system. Call bell, personal items and telephone within reach. Instruct patient to call for assistance. Room bathroom lighting operational. Non-slip footwear when patient is off stretcher. Physically safe environment: no spills, clutter or unnecessary equipment. Stretcher in lowest position, wheels locked, appropriate side rails in place. Provide visual cue, wrist band, yellow gown, etc. Monitor gait and stability. Monitor for mental status changes and reorient to person, place, and time. Review medications for side effects contributing to fall risk. Reinforce activity limits and safety measures with patient and family.

## 2019-03-21 DIAGNOSIS — S22.39XA FRACTURE OF ONE RIB, UNSPECIFIED SIDE, INITIAL ENCOUNTER FOR CLOSED FRACTURE: ICD-10-CM

## 2019-03-21 DIAGNOSIS — S02.401A MAXILLARY FRACTURE, UNSPECIFIED SIDE, INITIAL ENCOUNTER FOR CLOSED FRACTURE: ICD-10-CM

## 2019-03-21 DIAGNOSIS — S52.90XA UNSPECIFIED FRACTURE OF UNSPECIFIED FOREARM, INITIAL ENCOUNTER FOR CLOSED FRACTURE: ICD-10-CM

## 2019-03-21 DIAGNOSIS — K57.32 DIVERTICULITIS OF LARGE INTESTINE WITHOUT PERFORATION OR ABSCESS WITHOUT BLEEDING: ICD-10-CM

## 2019-03-21 DIAGNOSIS — I82.409 ACUTE EMBOLISM AND THROMBOSIS OF UNSPECIFIED DEEP VEINS OF UNSPECIFIED LOWER EXTREMITY: ICD-10-CM

## 2019-03-21 PROBLEM — G47.33 OBSTRUCTIVE SLEEP APNEA (ADULT) (PEDIATRIC): Chronic | Status: ACTIVE | Noted: 2017-11-10

## 2019-03-21 LAB
ANION GAP SERPL CALC-SCNC: 13 MMOL/L — SIGNIFICANT CHANGE UP (ref 5–17)
BASOPHILS # BLD AUTO: 0 K/UL — SIGNIFICANT CHANGE UP (ref 0–0.2)
BASOPHILS NFR BLD AUTO: 0.2 % — SIGNIFICANT CHANGE UP (ref 0–2)
BLD GP AB SCN SERPL QL: NEGATIVE — SIGNIFICANT CHANGE UP
BUN SERPL-MCNC: 24 MG/DL — HIGH (ref 7–23)
CALCIUM SERPL-MCNC: 8.7 MG/DL — SIGNIFICANT CHANGE UP (ref 8.4–10.5)
CHLORIDE SERPL-SCNC: 102 MMOL/L — SIGNIFICANT CHANGE UP (ref 96–108)
CO2 SERPL-SCNC: 25 MMOL/L — SIGNIFICANT CHANGE UP (ref 22–31)
CREAT SERPL-MCNC: 0.94 MG/DL — SIGNIFICANT CHANGE UP (ref 0.5–1.3)
EOSINOPHIL # BLD AUTO: 0.1 K/UL — SIGNIFICANT CHANGE UP (ref 0–0.5)
EOSINOPHIL NFR BLD AUTO: 0.8 % — SIGNIFICANT CHANGE UP (ref 0–6)
GLUCOSE SERPL-MCNC: 120 MG/DL — HIGH (ref 70–99)
HCT VFR BLD CALC: 37 % — LOW (ref 39–50)
HGB BLD-MCNC: 12.3 G/DL — LOW (ref 13–17)
LYMPHOCYTES # BLD AUTO: 1 K/UL — SIGNIFICANT CHANGE UP (ref 1–3.3)
LYMPHOCYTES # BLD AUTO: 10.5 % — LOW (ref 13–44)
MCHC RBC-ENTMCNC: 31.7 PG — SIGNIFICANT CHANGE UP (ref 27–34)
MCHC RBC-ENTMCNC: 33.4 GM/DL — SIGNIFICANT CHANGE UP (ref 32–36)
MCV RBC AUTO: 95.1 FL — SIGNIFICANT CHANGE UP (ref 80–100)
MONOCYTES # BLD AUTO: 0.9 K/UL — SIGNIFICANT CHANGE UP (ref 0–0.9)
MONOCYTES NFR BLD AUTO: 9.3 % — SIGNIFICANT CHANGE UP (ref 2–14)
NEUTROPHILS # BLD AUTO: 7.6 K/UL — HIGH (ref 1.8–7.4)
NEUTROPHILS NFR BLD AUTO: 79.2 % — HIGH (ref 43–77)
PLATELET # BLD AUTO: 121 K/UL — LOW (ref 150–400)
POTASSIUM SERPL-MCNC: 4 MMOL/L — SIGNIFICANT CHANGE UP (ref 3.5–5.3)
POTASSIUM SERPL-SCNC: 4 MMOL/L — SIGNIFICANT CHANGE UP (ref 3.5–5.3)
RBC # BLD: 3.89 M/UL — LOW (ref 4.2–5.8)
RBC # FLD: 13.1 % — SIGNIFICANT CHANGE UP (ref 10.3–14.5)
RH IG SCN BLD-IMP: POSITIVE — SIGNIFICANT CHANGE UP
SODIUM SERPL-SCNC: 140 MMOL/L — SIGNIFICANT CHANGE UP (ref 135–145)
WBC # BLD: 9.6 K/UL — SIGNIFICANT CHANGE UP (ref 3.8–10.5)
WBC # FLD AUTO: 9.6 K/UL — SIGNIFICANT CHANGE UP (ref 3.8–10.5)

## 2019-03-21 PROCEDURE — 73090 X-RAY EXAM OF FOREARM: CPT | Mod: 26,LT

## 2019-03-21 PROCEDURE — 73562 X-RAY EXAM OF KNEE 3: CPT | Mod: 26,RT

## 2019-03-21 PROCEDURE — 73030 X-RAY EXAM OF SHOULDER: CPT | Mod: 26,RT

## 2019-03-21 PROCEDURE — 73110 X-RAY EXAM OF WRIST: CPT | Mod: 26,LT,76

## 2019-03-21 PROCEDURE — 73120 X-RAY EXAM OF HAND: CPT | Mod: 26,RT

## 2019-03-21 PROCEDURE — 73060 X-RAY EXAM OF HUMERUS: CPT | Mod: 26,RT

## 2019-03-21 PROCEDURE — 73110 X-RAY EXAM OF WRIST: CPT | Mod: 26,50,77

## 2019-03-21 PROCEDURE — 99221 1ST HOSP IP/OBS SF/LOW 40: CPT

## 2019-03-21 PROCEDURE — 73070 X-RAY EXAM OF ELBOW: CPT | Mod: 26,RT

## 2019-03-21 PROCEDURE — 73070 X-RAY EXAM OF ELBOW: CPT | Mod: 26,LT,77

## 2019-03-21 PROCEDURE — 73130 X-RAY EXAM OF HAND: CPT | Mod: 26,50

## 2019-03-21 PROCEDURE — 73100 X-RAY EXAM OF WRIST: CPT | Mod: 26,59,LT

## 2019-03-21 RX ORDER — KETOROLAC TROMETHAMINE 30 MG/ML
15 SYRINGE (ML) INJECTION EVERY 6 HOURS
Qty: 0 | Refills: 0 | Status: DISCONTINUED | OUTPATIENT
Start: 2019-03-21 | End: 2019-03-22

## 2019-03-21 RX ORDER — FENTANYL CITRATE 50 UG/ML
25 INJECTION INTRAVENOUS ONCE
Qty: 0 | Refills: 0 | Status: DISCONTINUED | OUTPATIENT
Start: 2019-03-21 | End: 2019-03-21

## 2019-03-21 RX ORDER — IBUPROFEN 200 MG
600 TABLET ORAL EVERY 8 HOURS
Qty: 0 | Refills: 0 | Status: DISCONTINUED | OUTPATIENT
Start: 2019-03-21 | End: 2019-03-21

## 2019-03-21 RX ORDER — HYDROMORPHONE HYDROCHLORIDE 2 MG/ML
0.5 INJECTION INTRAMUSCULAR; INTRAVENOUS; SUBCUTANEOUS
Qty: 0 | Refills: 0 | Status: DISCONTINUED | OUTPATIENT
Start: 2019-03-21 | End: 2019-03-21

## 2019-03-21 RX ORDER — CEFAZOLIN SODIUM 1 G
2000 VIAL (EA) INJECTION EVERY 8 HOURS
Qty: 0 | Refills: 0 | Status: DISCONTINUED | OUTPATIENT
Start: 2019-03-21 | End: 2019-03-21

## 2019-03-21 RX ORDER — LIDOCAINE 4 G/100G
1 CREAM TOPICAL DAILY
Qty: 0 | Refills: 0 | Status: DISCONTINUED | OUTPATIENT
Start: 2019-03-21 | End: 2019-03-21

## 2019-03-21 RX ORDER — ENOXAPARIN SODIUM 100 MG/ML
40 INJECTION SUBCUTANEOUS EVERY 24 HOURS
Qty: 0 | Refills: 0 | Status: DISCONTINUED | OUTPATIENT
Start: 2019-03-21 | End: 2019-03-21

## 2019-03-21 RX ORDER — OXYCODONE HYDROCHLORIDE 5 MG/1
10 TABLET ORAL EVERY 4 HOURS
Qty: 0 | Refills: 0 | Status: DISCONTINUED | OUTPATIENT
Start: 2019-03-21 | End: 2019-03-26

## 2019-03-21 RX ORDER — LIDOCAINE 4 G/100G
1 CREAM TOPICAL DAILY
Qty: 0 | Refills: 0 | Status: DISCONTINUED | OUTPATIENT
Start: 2019-03-21 | End: 2019-03-26

## 2019-03-21 RX ORDER — CEFAZOLIN SODIUM 1 G
VIAL (EA) INJECTION
Qty: 0 | Refills: 0 | Status: DISCONTINUED | OUTPATIENT
Start: 2019-03-21 | End: 2019-03-21

## 2019-03-21 RX ORDER — CEFAZOLIN SODIUM 1 G
2000 VIAL (EA) INJECTION ONCE
Qty: 0 | Refills: 0 | Status: COMPLETED | OUTPATIENT
Start: 2019-03-21 | End: 2019-03-21

## 2019-03-21 RX ORDER — ONDANSETRON 8 MG/1
4 TABLET, FILM COATED ORAL ONCE
Qty: 0 | Refills: 0 | Status: DISCONTINUED | OUTPATIENT
Start: 2019-03-21 | End: 2019-03-21

## 2019-03-21 RX ORDER — GABAPENTIN 400 MG/1
100 CAPSULE ORAL THREE TIMES A DAY
Qty: 0 | Refills: 0 | Status: DISCONTINUED | OUTPATIENT
Start: 2019-03-21 | End: 2019-03-23

## 2019-03-21 RX ORDER — MAGNESIUM HYDROXIDE 400 MG/1
30 TABLET, CHEWABLE ORAL DAILY
Qty: 0 | Refills: 0 | Status: DISCONTINUED | OUTPATIENT
Start: 2019-03-21 | End: 2019-03-26

## 2019-03-21 RX ORDER — OXYCODONE HYDROCHLORIDE 5 MG/1
10 TABLET ORAL EVERY 4 HOURS
Qty: 0 | Refills: 0 | Status: DISCONTINUED | OUTPATIENT
Start: 2019-03-21 | End: 2019-03-21

## 2019-03-21 RX ORDER — AMPICILLIN SODIUM AND SULBACTAM SODIUM 250; 125 MG/ML; MG/ML
3 INJECTION, POWDER, FOR SUSPENSION INTRAMUSCULAR; INTRAVENOUS ONCE
Qty: 0 | Refills: 0 | Status: DISCONTINUED | OUTPATIENT
Start: 2019-03-21 | End: 2019-03-21

## 2019-03-21 RX ORDER — ACETAMINOPHEN 500 MG
650 TABLET ORAL EVERY 6 HOURS
Qty: 0 | Refills: 0 | Status: DISCONTINUED | OUTPATIENT
Start: 2019-03-21 | End: 2019-03-23

## 2019-03-21 RX ORDER — SODIUM CHLORIDE 9 MG/ML
1000 INJECTION INTRAMUSCULAR; INTRAVENOUS; SUBCUTANEOUS
Qty: 0 | Refills: 0 | Status: DISCONTINUED | OUTPATIENT
Start: 2019-03-21 | End: 2019-03-24

## 2019-03-21 RX ORDER — MORPHINE SULFATE 50 MG/1
4 CAPSULE, EXTENDED RELEASE ORAL ONCE
Qty: 0 | Refills: 0 | Status: DISCONTINUED | OUTPATIENT
Start: 2019-03-21 | End: 2019-03-21

## 2019-03-21 RX ORDER — ENOXAPARIN SODIUM 100 MG/ML
40 INJECTION SUBCUTANEOUS EVERY 24 HOURS
Qty: 0 | Refills: 0 | Status: DISCONTINUED | OUTPATIENT
Start: 2019-03-21 | End: 2019-03-26

## 2019-03-21 RX ORDER — OXYCODONE HYDROCHLORIDE 5 MG/1
5 TABLET ORAL EVERY 4 HOURS
Qty: 0 | Refills: 0 | Status: DISCONTINUED | OUTPATIENT
Start: 2019-03-21 | End: 2019-03-21

## 2019-03-21 RX ORDER — CEFAZOLIN SODIUM 1 G
2000 VIAL (EA) INJECTION EVERY 8 HOURS
Qty: 0 | Refills: 0 | Status: COMPLETED | OUTPATIENT
Start: 2019-03-21 | End: 2019-03-22

## 2019-03-21 RX ORDER — DOCUSATE SODIUM 100 MG
100 CAPSULE ORAL THREE TIMES A DAY
Qty: 0 | Refills: 0 | Status: DISCONTINUED | OUTPATIENT
Start: 2019-03-21 | End: 2019-03-23

## 2019-03-21 RX ORDER — HYDROMORPHONE HYDROCHLORIDE 2 MG/ML
1 INJECTION INTRAMUSCULAR; INTRAVENOUS; SUBCUTANEOUS ONCE
Qty: 0 | Refills: 0 | Status: DISCONTINUED | OUTPATIENT
Start: 2019-03-21 | End: 2019-03-21

## 2019-03-21 RX ORDER — OXYCODONE HYDROCHLORIDE 5 MG/1
5 TABLET ORAL EVERY 4 HOURS
Qty: 0 | Refills: 0 | Status: DISCONTINUED | OUTPATIENT
Start: 2019-03-21 | End: 2019-03-26

## 2019-03-21 RX ORDER — SODIUM CHLORIDE 9 MG/ML
1000 INJECTION, SOLUTION INTRAVENOUS
Qty: 0 | Refills: 0 | Status: DISCONTINUED | OUTPATIENT
Start: 2019-03-21 | End: 2019-03-21

## 2019-03-21 RX ORDER — HYDROMORPHONE HYDROCHLORIDE 2 MG/ML
1 INJECTION INTRAMUSCULAR; INTRAVENOUS; SUBCUTANEOUS EVERY 6 HOURS
Qty: 0 | Refills: 0 | Status: DISCONTINUED | OUTPATIENT
Start: 2019-03-21 | End: 2019-03-26

## 2019-03-21 RX ORDER — ACETAMINOPHEN 500 MG
975 TABLET ORAL EVERY 6 HOURS
Qty: 0 | Refills: 0 | Status: DISCONTINUED | OUTPATIENT
Start: 2019-03-21 | End: 2019-03-21

## 2019-03-21 RX ADMIN — FENTANYL CITRATE 25 MICROGRAM(S): 50 INJECTION INTRAVENOUS at 01:51

## 2019-03-21 RX ADMIN — Medication 100 MILLIGRAM(S): at 03:11

## 2019-03-21 RX ADMIN — ENOXAPARIN SODIUM 40 MILLIGRAM(S): 100 INJECTION SUBCUTANEOUS at 17:00

## 2019-03-21 RX ADMIN — MORPHINE SULFATE 4 MILLIGRAM(S): 50 CAPSULE, EXTENDED RELEASE ORAL at 07:35

## 2019-03-21 RX ADMIN — SODIUM CHLORIDE 1000 MILLILITER(S): 9 INJECTION INTRAMUSCULAR; INTRAVENOUS; SUBCUTANEOUS at 00:13

## 2019-03-21 RX ADMIN — GABAPENTIN 100 MILLIGRAM(S): 400 CAPSULE ORAL at 15:00

## 2019-03-21 RX ADMIN — MORPHINE SULFATE 4 MILLIGRAM(S): 50 CAPSULE, EXTENDED RELEASE ORAL at 07:45

## 2019-03-21 RX ADMIN — OXYCODONE HYDROCHLORIDE 10 MILLIGRAM(S): 5 TABLET ORAL at 16:30

## 2019-03-21 RX ADMIN — HYDROMORPHONE HYDROCHLORIDE 0.5 MILLIGRAM(S): 2 INJECTION INTRAMUSCULAR; INTRAVENOUS; SUBCUTANEOUS at 15:00

## 2019-03-21 RX ADMIN — GABAPENTIN 100 MILLIGRAM(S): 400 CAPSULE ORAL at 22:50

## 2019-03-21 RX ADMIN — Medication 100 MILLIGRAM(S): at 17:13

## 2019-03-21 RX ADMIN — HYDROMORPHONE HYDROCHLORIDE 1 MILLIGRAM(S): 2 INJECTION INTRAMUSCULAR; INTRAVENOUS; SUBCUTANEOUS at 04:01

## 2019-03-21 RX ADMIN — HYDROMORPHONE HYDROCHLORIDE 0.5 MILLIGRAM(S): 2 INJECTION INTRAMUSCULAR; INTRAVENOUS; SUBCUTANEOUS at 14:50

## 2019-03-21 RX ADMIN — HYDROMORPHONE HYDROCHLORIDE 1 MILLIGRAM(S): 2 INJECTION INTRAMUSCULAR; INTRAVENOUS; SUBCUTANEOUS at 01:51

## 2019-03-21 RX ADMIN — FENTANYL CITRATE 25 MICROGRAM(S): 50 INJECTION INTRAVENOUS at 00:14

## 2019-03-21 RX ADMIN — MORPHINE SULFATE 4 MILLIGRAM(S): 50 CAPSULE, EXTENDED RELEASE ORAL at 01:51

## 2019-03-21 RX ADMIN — Medication 15 MILLIGRAM(S): at 23:46

## 2019-03-21 RX ADMIN — MORPHINE SULFATE 4 MILLIGRAM(S): 50 CAPSULE, EXTENDED RELEASE ORAL at 01:02

## 2019-03-21 RX ADMIN — SODIUM CHLORIDE 125 MILLILITER(S): 9 INJECTION INTRAMUSCULAR; INTRAVENOUS; SUBCUTANEOUS at 23:45

## 2019-03-21 RX ADMIN — Medication 15 MILLIGRAM(S): at 18:37

## 2019-03-21 RX ADMIN — HYDROMORPHONE HYDROCHLORIDE 0.5 MILLIGRAM(S): 2 INJECTION INTRAMUSCULAR; INTRAVENOUS; SUBCUTANEOUS at 13:50

## 2019-03-21 RX ADMIN — FENTANYL CITRATE 25 MICROGRAM(S): 50 INJECTION INTRAVENOUS at 00:13

## 2019-03-21 RX ADMIN — OXYCODONE HYDROCHLORIDE 10 MILLIGRAM(S): 5 TABLET ORAL at 17:00

## 2019-03-21 RX ADMIN — HYDROMORPHONE HYDROCHLORIDE 0.5 MILLIGRAM(S): 2 INJECTION INTRAMUSCULAR; INTRAVENOUS; SUBCUTANEOUS at 14:00

## 2019-03-21 RX ADMIN — Medication 650 MILLIGRAM(S): at 18:39

## 2019-03-21 RX ADMIN — SODIUM CHLORIDE 125 MILLILITER(S): 9 INJECTION, SOLUTION INTRAVENOUS at 07:38

## 2019-03-21 RX ADMIN — Medication 100 MILLIGRAM(S): at 22:50

## 2019-03-21 NOTE — H&P ADULT - NSHPLABSRESULTS_GEN_ALL_CORE
13.3   9.9   )-----------( 160      ( 20 Mar 2019 22:48 )             40.3   03-20    141  |  103  |  21  ----------------------------<  158<H>  3.4<L>   |  24  |  1.02    Ca    8.9      20 Mar 2019 22:48    TPro  6.8  /  Alb  4.0  /  TBili  0.3  /  DBili  x   /  AST  123<H>  /  ALT  104<H>  /  AlkPhos  71  03-20  PT/INR - ( 20 Mar 2019 22:48 )   PT: 11.2 sec;   INR: 0.97 ratio    PTT - ( 20 Mar 2019 22:48 )  PTT:24.6 sec    < from: Xray Chest 1 View- PORTABLE-Urgent (03.20.19 @ 22:46) >  ******PRELIMINARY REPORT******        INTERPRETATION:  no emergent findings  < end of copied text >    < from: Xray Pelvis AP only (03.20.19 @ 22:46) >  ******PRELIMINARY REPORT******        INTERPRETATION:  no emergent findings  < end of copied text >    < from: CT Maxillofacial No Cont (03.20.19 @ 23:25) >  IMPRESSION:     CT BRAIN: No acute intracranial hemorrhage or displaced skull fracture. If clinically indicated, short-term follow-up or MRI may be obtained for further evaluation.    CT CERVICAL SPINE: No fracture or traumatic malalignment in the cervical spine. If there is clinical suspicion for acute fracture or ligamentous/cord injury, MRI may be obtained for further evaluation.    CT MAXILLOFACIAL BONES: Right-sided zygomaticomaxillary complex fracture as described. Bilateral nasal bone fractures.  < end of copied text >    < from: Xray Elbow AP + Lateral, Left (03.21.19 @ 00:21) >  ******PRELIMINARY REPORT******        INTERPRETATION:  Limited evaluation of the left upper extremity.    Probable left elbow dislocation. Ossific fragments adjacent to the radial neck are of uncertain etiology and may represent fractures of unknown donor site, however further evaluation is limited due to limited radiographic views.    Acute comminuted fracture of the left distal radial diaphysis with intra-articular extension and mild displacement of the distal fracture fragment.  < end of copied text >    < from: Xray Shoulder 2 Views, Right (03.21.19 @ 00:28) >  ******PRELIMINARY REPORT******        INTERPRETATION:  Acute, mildly displaced and impacted fracture of the distal radial metaphysis. Mild dorsal attenuation of the distal fracture fragment. No intra-articular extension.    No acute fracture or dislocation of the right shoulder, humerus, elbow, proximal forearm, and hand.

## 2019-03-21 NOTE — CONSULT NOTE ADULT - ATTENDING COMMENTS
I have interviewed and examined the patient and reviewed the residents note including the history, exam, assessment, and plan.  I agree with the residents assessment and plan.    63 M found to have right orbital floor fracture, right ZMC fracture after a high fall from a roof. Clinically, globe intact, no evidence of extraocular muscle entrapment.     Plan:  - no nose blowing, sneezing, heavy lifting, straining  - ice packs to right eye for first 48 hours  - Nasal Decongestants BID PRN  - PO Antibiotics x 7-10 days  - plastics consult appreciated  - findings and plan discussed with patient and primary team      Follow-Up:  Patient should follow up his/her ophthalmologist or in the [facility] within 1 week of discharge.    Honey Kirk MD

## 2019-03-21 NOTE — ED ADULT NURSE REASSESSMENT NOTE - NS ED NURSE REASSESS COMMENT FT1
02:20. Orthopedic resident MD Tone Nicole at bedside splinting upper extremities. 02:20. Orthopedic resident MD Tone Nicole at bedside splinting bilateral upper extremities.

## 2019-03-21 NOTE — CONSULT NOTE ADULT - CONSULT REASON
B/L Wrist Pain
facial fractures and lip lacerations
level II trauma - fall from 20 ft
orbital floor fracture
pre op eval

## 2019-03-21 NOTE — CONSULT NOTE ADULT - ASSESSMENT
63M with PMH of ANDREWS presents with level 2 trauma activation after fall from 20ft roof. Found to have right ZMC and nasal bone fractures.    -full consultation to follow    Brayan Berumen MD PGY2 63M with PMH of ANDREWS presents with level 2 trauma activation after fall from 20ft roof. Found to have right ZMC and nasal bone fractures.    -nonoperative management for now  -will reassess once swelling decreases as an outpatient  -bacitracin to lip lacerations  -nylon sutures out in 5-7 days, vicryls are absorbable  -sinus precautions  -elevate HOB  -discussed with Dr. Fernando  -remaining care per ortho/trauma    Brayan Berumen MD PGY2

## 2019-03-21 NOTE — PROGRESS NOTE ADULT - ASSESSMENT
64 yo male s/p fall from a ladder presents  w/ Grade 1 Open R DR Fx, L DR Fx, L Elbow Dislocation, and L 4th Digit Base of Distal Phalanx fracture. Patient seen post op resting comfortably

## 2019-03-21 NOTE — PROGRESS NOTE ADULT - ATTENDING COMMENTS
I am a non participating [employer] physician seeing Pt in coverage for Dr Zarate I am a non participating [facility] physician seeing. Pt in coverage for Dr Zarate. The above patient examination was reviewed with Dr. Wu and I agree with his evaluation, assessment and treatment plan.  Mark Zarate M.D.

## 2019-03-21 NOTE — BRIEF OPERATIVE NOTE - OPERATION/FINDINGS
Right 5th finger laceration  Right poke hole at distal ulna  Left Cornoid fracture  Left comminuted distal radius fracture  Left thumb laceration

## 2019-03-21 NOTE — BRIEF OPERATIVE NOTE - NSICDXBRIEFPROCEDURE_GEN_ALL_CORE_FT
PROCEDURES:  Irrigation and debridement, hand or wrist 21-Mar-2019 12:20:14 right Myranda Bunch  Open reduction and internal fixation (ORIF) of fracture of bilateral distal radius and ulna bones 21-Mar-2019 12:18:26  Myranda Bunch

## 2019-03-21 NOTE — CONSULT NOTE ADULT - SUBJECTIVE AND OBJECTIVE BOX
63M who presents to [facility] with a c/o of b/l wrist pain and L elbow pain. Patient states he was helping his friend on a roof earlier today when he stepped backwards and fell 20 feet to the ground. Admits to headstrike and LOC as he is unable to fully recall the event. Level II trauma code initiated upon arrival to hospital. Denies any numbness or tingling. Admits to previous L arm surgery in 1957 but denies any other orthopedic surgeries.    PAST MEDICAL & SURGICAL HISTORY:  Diverticulitis of large intestine without perforation or abscess without bleeding  DVT (deep venous thrombosis): LLE 2012, treated with coumadin x 6 months.  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 Patient states he was helping his friend on a roof earlier today when he stepped backwards and fell 20 feet to the ground. Admits to headstrike and LOC as he is unable to fully recall the event. Level II trauma code initiated upon arrival to hospital. Denies any numbness or tingling. Admits to previous L arm surgery in 1957 but denies any other orthopedic surgeries.    PAST MEDICAL & SURGICAL HISTORY:  Diverticulitis of large intestine without perforation or abscess without bleeding  DVT (deep venous thrombosis): LLE 2012, treated with coumadin x 6 months.  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  Closed fracture of arm, left, initial encounter: s/p ORIF 1957    Allergies  No Known Allergies    Home Medications:  Provigil 100 mg oral tablet: 1 tab(s) orally once a day (in the morning) (16 Nov 2017 05:41)  supplement: OPE-3 antioxidant:  (16 Nov 2017 05:41)      PHYSICAL EXAM:  Vital Signs Last 24 Hrs  T(C): 36.7 (21 Mar 2019 00:14), Max: 36.7 (20 Mar 2019 22:32)  T(F): 98.1 (21 Mar 2019 00:14), Max: 98.1 (20 Mar 2019 22:32)  HR: 81 (21 Mar 2019 00:58) (75 - 81)  BP: 153/96 (21 Mar 2019 00:58) (148/100 - 169/79)  BP(mean): --  RR: 18 (21 Mar 2019 00:58) (16 - 18)  SpO2: 97% (21 Mar 2019 00:58) (95% - 98%)    Gen: Mild distress; Multiple lip lacerations    RUE: + Visible deformity of wrist  2mm poke hole over volar aspect of wrist  +ttp diffusely around wrist  +AIN/PIN/Med/Ulnar/Rad  ROM limited 2/2 to pain  SILT C5-T1  +2/4 Radial pulse  +Compartments soft and compressible    LUE:  Skin intact;+ Swelling and ecchymosis   + Visible deformity of wrist and elbow  2cm laceration over ulnar side of palm  +ttp diffusely around wrist  +ttp over 4th digit distal phalanx  ROM limited 2/2 to pain  +AIN/PIN/Med/Ulnar/Rad  SILT C5-T1  +2/4 Radial pulse  +Compartments soft and compressible    Secondary Survey:   RLE/LLE: No TTP over bony prominences, SILT, palpable pulses, full/painless range of motion, compartments soft    Spine: No bony tenderness. No palpable stepoffs.     Procedure:  After verbal consent was obtained from the patient, the skin was prepped in a standard sterile fashion, and a hematoma block was administered to both wrists using 10cc of 1% lidocaine without epinephrine. After anesthetization, closed reduction maneuvers were attempted and the patient was placed into sugar tong splints. A closed reduction maneuver was also performed on the L elbow as the elbow was also dislocated. All bony prominences were well padded. Patient was NVI preprocedure and post-procedure. Patient tolerated procedure well without any complications.

## 2019-03-21 NOTE — H&P ADULT - NSICDXPASTSURGICALHX_GEN_ALL_CORE_FT
PAST SURGICAL HISTORY:  Closed fracture of arm, left, initial encounter s/p ORIF 1957    History of corneal transplant left eye secondary to keratoconus

## 2019-03-21 NOTE — PRE-ANESTHESIA EVALUATION ADULT - NSANTHPMHFT_GEN_ALL_CORE
s/p fall. chart and trauma note reviewed -> ok for OR per trauma. no hx cv/pulm dz. prior et> 1 flight no cp/sob. non-compliant mark

## 2019-03-21 NOTE — CHART NOTE - NSCHARTNOTEFT_GEN_A_CORE
POST-OPERATIVE NOTE    Subjective:  Patient is s/p irrigation and debridement of wrist and ORIF of b/l distal radius and ulna bones. Patient denies any n/v, chest pain, or SOB. Pain is currently well controlled. Recovering appropriately.    Objective:  Vital Signs Last 24 Hrs  T(C): 37.3 (21 Mar 2019 12:30), Max: 37.4 (21 Mar 2019 06:14)  T(F): 99.1 (21 Mar 2019 12:30), Max: 99.4 (21 Mar 2019 06:14)  HR: 87 (21 Mar 2019 16:00) (73 - 89)  BP: 124/74 (21 Mar 2019 16:00) (112/73 - 169/79)  BP(mean): 93 (21 Mar 2019 16:00) (86 - 106)  RR: 16 (21 Mar 2019 14:30) (16 - 18)  SpO2: 93% (21 Mar 2019 16:00) (92% - 100%)  I&O's Detail    21 Mar 2019 07:01  -  21 Mar 2019 16:30  --------------------------------------------------------  IN:    sodium chloride 0.9%.: 250 mL  Total IN: 250 mL    OUT:    Voided: 250 mL  Total OUT: 250 mL    Total NET: 0 mL        ceFAZolin   IVPB 2000  ceFAZolin   IVPB 2000  enoxaparin Injectable 40    PAST MEDICAL & SURGICAL HISTORY:  Diverticulitis of large intestine without perforation or abscess without bleeding  DVT (deep venous thrombosis): LLE 2012, treated with coumadin x 6 months.  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  Closed fracture of arm, left, initial encounter: s/p ORIF 1957        Physical Exam:  General: NAD, resting comfortably in bed, multiple ecchymoses present on face  Pulmonary: Nonlabored breathing, no respiratory distress, nasal canula in place  Cardiovascular: RRR  Abdominal: soft, NTND, no rebound or guarding  Extremities: WWP, L should in sling, b/l UE with ACE wraps present from hand to elbow, good cap refill b/l, sensation and motor distally intact      LABS:                        12.3   9.6   )-----------( 121      ( 21 Mar 2019 14:04 )             37.0     03-21    140  |  102  |  24<H>  ----------------------------<  120<H>  4.0   |  25  |  0.94    Ca    8.7      21 Mar 2019 14:04    TPro  6.8  /  Alb  4.0  /  TBili  0.3  /  DBili  x   /  AST  123<H>  /  ALT  104<H>  /  AlkPhos  71  03-20    PT/INR - ( 20 Mar 2019 22:48 )   PT: 11.2 sec;   INR: 0.97 ratio         PTT - ( 20 Mar 2019 22:48 )  PTT:24.6 sec  CAPILLARY BLOOD GLUCOSE          Radiology and Additional Studies:    Assessment:  The patient is a 63y Male who is now several hours post-op from a irrigation and debridement of wrist and ORIF of b/l distal radius and ulna bones.     Plan:  - Pain control as needed  - lovenox for DVT ppx  - OOB and ambulating as tolerated  - F/u AM labs  - f/u ortho plans  - encourage IS  - PT    ACS Surgery  [phone]

## 2019-03-21 NOTE — H&P ADULT - ASSESSMENT
63 year old male s/p fall from 20 feet, hemodynamically stable, with multiple facial fracture, bilateral distal radius fractures, and bilateral rib fractures (R 5-9 and L 4-6).    - admit to trauma surgery  - in setting of 2L+ incentive spirometry, patient appropriate for floor admission  - multimodal analgesia with tylenol, ibuprofen, oxycodone, and lidoderm  - appreciate orthopedics and face fracture consultations    Seen and examined with Dr. Webb  --ROB Lopez, PGY-4

## 2019-03-21 NOTE — ED ADULT NURSE REASSESSMENT NOTE - NS ED NURSE REASSESS COMMENT FT1
Wedding band (gold) that was given to RN by EMS at arrival is now given to pt's wife Emily at this time.

## 2019-03-21 NOTE — BRIEF OPERATIVE NOTE - NSICDXBRIEFPREOP_GEN_ALL_CORE_FT
PRE-OP DIAGNOSIS:  Closed fracture dislocation of distal interphalangeal joint of finger 21-Mar-2019 12:22:36  Myranda Bunch  Other type I or II open intra-articular fracture of distal end of right radius, initial encounter 21-Mar-2019 12:21:21 pokehole open on ulnar side Myranda Bunch  Traumatic closed displaced fracture of distal end of left radius 21-Mar-2019 12:19:52  Myranda Bunch

## 2019-03-21 NOTE — CHART NOTE - NSCHARTNOTEFT_GEN_A_CORE
Called Orthopedics team to follow up requested consult. Resident assessed patient, now with plan for OR today to address R distal radius, as there is concern for an open fracture. Will maintain patient NPO, and continue antibiotics.     No contraindications per trauma surgery for operative repair with orthopedics team.    --ROB Lopez, PGY-4

## 2019-03-21 NOTE — CONSULT NOTE ADULT - SUBJECTIVE AND OBJECTIVE BOX
[facility] Ophthalmology Consult Note    HPI:  63 M presents status post fall found to have upper extremity fractures, rib fractures, right orbital floor fracture, ZMC fracture. Patient denies any vision changes, pain with eye movements, double vision,    PMH: diverticulitis, ANDREWS  Meds: see sunrise  POcHx (including surgeries/lasers/trauma):  PK OS, keratoconus  Drops: None  FamHx: None  Social Hx: None  Allergies: NKDA    ROS:  General (neg), Vision (per HPI), Head and Neck (neg), Pulm (neg), CV (neg), GI (neg),  (neg), Musculoskeletal (neg), Skin/Integ (neg), Neuro (neg), Endocrine (neg), Heme (neg), All/Immuno (neg)    Mood and Affect Appropriate ( x ),  Oriented to Time, Place, and Person x 3 ( x )    Ophthalmology Exam    Visual acuity (cc): 20/30 OU  Pupils: PERRL OU, no APD  Ttono: 14 OU  Extraocular movements (EOMs): Full OU, no pain, no diplopia   Confrontational Visual Field (CVF):  Full OU  Color Plates: 12/12 OU    Pen Light Exam (PLE)  External:  Flat OU  Lids/Lashes/Lacrimal Ducts: 1+ ecchymoses OD flat OS   Sclera/Conjunctiva:  subconjunctival hemorrhage OD W+Q OS  Cornea: Cl OD PK OS   Anterior Chamber: D+F OU  Iris:  Flat OU  Lens:  Cl OU    Fundus Exam: dilated with 1% tropicamide and 2.5% phenylephrine  Approval obtained from primary team for dilation  Patient aware that pupils can remained dilated for at least 4-6 hours  Exam performed with 20D lens    Vitreous: wnl OU  Disc, cup/disc: sharp and pink, 0.4 OU  Macula:  wnl OU  Vessels:  wnl OU  Periphery: wnl OU    Diagnostic Testing:    CT BRAIN: No acute intracranial hemorrhage or displaced skull fracture. If   clinically indicated, short-term follow-up or MRI may be obtained for   further evaluation.     CT CERVICAL SPINE: No fracture or traumatic malalignment in the cervical   spine. If there is clinical suspicion for acute fracture or ligamentous/cord   injury, MRI may be obtained for further evaluation.     CT MAXILLOFACIAL BONES: Right-sided zygomaticomaxillary complex fracture as   described. Bilateral nasal bone fractures.       CT MAXILLOFACIAL BONES: There is an acute, inferiorly displaced (0.6 cm)   fracture of the right orbital floor. There is a laterally displaced fracture   of the right lateral orbital wall. Visualized globes are symmetric. The   extraocular muscles are symmetric without entrapment. The retrobulbar and   extraconal soft tissue is within normal limits.     There is an acute, comminuted fracture involving the anterior and posterior   wall of the right maxillary sinus. There are hemorrhagic fluid levels in   bilateral maxillary and sphenoid sinuses. There is underlying mucosal   thickening of the sinuses. The maxillary sinus ostia and sphenoethmoid   recesses are occluded. Visualized tympanomastoid region is unremarkable.     There are acute, bilateral nasal bone fractures, depressed on the right and   laterally displaced on the left. The bony nasal septum appears mostly   intact. There is debris in the nasal cavities extending to the nasopharynx.     There is an acute, minimally displaced fracture of the right zygomatic arch.   The left zygomatic arch appears intact. The medial and lateral pterygoid   plates are intact. The mandible is intact, given the extent of artifact.       Assessment:  63 M found to have right orbital floor fracture, right ZMC fracture. Clinically, globe intact, no evidence of extraocular muscle entrapment.     Plan:  - no nose blowing, sneezing, heavy lifting, straining  - ice packs to right eye  - Nasal Decongestants BID   - PO Antibiotics x 7-10 days  - plastics consult appreciated      Follow-Up:  Patient should follow up his/her ophthalmologist or in the [facility] Ophthalmology Practice within 1 week of discharge.  [address]  [place] [address]  [phone]    SDW Dr. Kirk [facility] Ophthalmology Consult Note    HPI:  63 M presents status post fall found to have upper extremity fractures, rib fractures, right orbital floor fracture, ZMC fracture. Patient denies any vision changes, pain with eye movements, double vision.    PMH: diverticulitis, ANDREWS  Meds: see sunrise, reviewed  POcHx (including surgeries/lasers/trauma):  PK OS, keratoconus  Drops: None  FamHx: None, no glaucoma  Social Hx: None  Allergies: NKDA    ROS:  General (neg), Vision (per HPI), Head and Neck (neg), Pulm (neg), CV (neg), GI (neg),  (neg), Musculoskeletal (neg), Skin/Integ (neg), Neuro (neg), Endocrine (neg), Heme (neg), All/Immuno (neg)    Mood and Affect Appropriate ( x ),  Oriented to Time, Place, and Person x 3 ( x )    Ophthalmology Exam    Visual acuity (cc): 20/30 OU  Pupils: PERRL OU, no APD OU  Ttono: 14 OU  Extraocular movements (EOMs): Full OU, no pain, no diplopia   Confrontational Visual Field (CVF):  Full OU  Color Plates: 12/12 OU    Pen Light Exam (PLE)  External:  Flat OU  Lids/Lashes/Lacrimal Ducts: 1+ ecchymoses OD flat OS   Sclera/Conjunctiva:  subconjunctival hemorrhage OD W+Q OS  Cornea: Cl OD PK clear, gray negative OS   Anterior Chamber: D+F OU  Iris:  Flat OU  Lens:  Cl OU    Fundus Exam: dilated with 1% tropicamide and 2.5% phenylephrine  Approval obtained from primary team for dilation  Patient aware that pupils can remained dilated for at least 4-6 hours  Exam performed with 20D lens    Vitreous: wnl OU  Disc, cup/disc: sharp and pink, 0.4 OU  Macula:  wnl OU  Vessels:  wnl OU  Periphery: wnl OU    Diagnostic Testing:    CT BRAIN: No acute intracranial hemorrhage or displaced skull fracture. If   clinically indicated, short-term follow-up or MRI may be obtained for   further evaluation.     CT CERVICAL SPINE: No fracture or traumatic malalignment in the cervical   spine. If there is clinical suspicion for acute fracture or ligamentous/cord   injury, MRI may be obtained for further evaluation.     CT MAXILLOFACIAL BONES: Right-sided zygomaticomaxillary complex fracture as   described. Bilateral nasal bone fractures.       CT MAXILLOFACIAL BONES: There is an acute, inferiorly displaced (0.6 cm)   fracture of the right orbital floor. There is a laterally displaced fracture   of the right lateral orbital wall. Visualized globes are symmetric. The   extraocular muscles are symmetric without entrapment. The retrobulbar and   extraconal soft tissue is within normal limits.     There is an acute, comminuted fracture involving the anterior and posterior   wall of the right maxillary sinus. There are hemorrhagic fluid levels in   bilateral maxillary and sphenoid sinuses. There is underlying mucosal   thickening of the sinuses. The maxillary sinus ostia and sphenoethmoid   recesses are occluded. Visualized tympanomastoid region is unremarkable.     There are acute, bilateral nasal bone fractures, depressed on the right and   laterally displaced on the left. The bony nasal septum appears mostly   intact. There is debris in the nasal cavities extending to the nasopharynx.     There is an acute, minimally displaced fracture of the right zygomatic arch.   The left zygomatic arch appears intact. The medial and lateral pterygoid   plates are intact. The mandible is intact, given the extent of artifact.       Assessment:  63 M found to have right orbital floor fracture, right ZMC fracture after a high fall from a roof. Clinically, globe intact, no evidence of extraocular muscle entrapment.     Plan:  - no nose blowing, sneezing, heavy lifting, straining  - ice packs to right eye for first 48 hours  - Nasal Decongestants BID PRN  - PO Antibiotics x 7-10 days  - plastics consult appreciated  - findings and plan discussed with patient and primary team      Follow-Up:  Patient should follow up his/her ophthalmologist or in the [facility] Ophthalmology Practice within 1 week of discharge.  [address]  [place] [address]  [phone]    SDW Dr. Kirk

## 2019-03-21 NOTE — H&P ADULT - NSHPPHYSICALEXAM_GEN_ALL_CORE
T(C): 36.7 (03-21-19 @ 00:14), Max: 36.7 (03-20-19 @ 22:32)  HR: 81 (03-21-19 @ 00:58) (75 - 81)  BP: 153/96 (03-21-19 @ 00:58) (148/100 - 169/79)  RR: 18 (03-21-19 @ 00:58) (16 - 18)  SpO2: 97% (03-21-19 @ 00:58) (95% - 98%)    General: well developed, well groomed, NAD  Neuro: alert and oriented, no focal deficits, moves all extremities spontaneously  HEENT: marked swelling over R face, nontender, PERRL (2mm), EOMI, mucosa moist  Respiratory: airway patent, respirations unlabored  Back: no step off, deformity or discoloration  CVS: regular rate and rhythm  Chest: tender on R anterior chest, no deformity  Abdomen: soft, nontender, nondistended  Pelvis/: intact, stable, grossly normal tone  Extremities: bilateral distal upper extremity deformities, with intact skin; sensation and movement grossly intact; lower extremities without angulation, discoloration or deformity  Vascular: bilateral palpable radial and DP pulses  Skin: warm, dry, appropriate color

## 2019-03-21 NOTE — CONSULT NOTE ADULT - ASSESSMENT
A/P: 63M w/ Grade 1 Open R DR Osman, L DR Osman, L Elbow Dislocation, and L 4th Digit Base of Distal Phalanx fracture  Ancef 2g given in ED  Plan for OR today for ORIF of b/l distal radius  NPO  IVF while NPO  Analgesia  Hold all chemical DVT ppx for OR today  NWB B/l UE  PT/OT  Encourage incentive spirometry  DC planning  Will discuss with attending and advise if plan changes A/P: 63M w/ Grade 1 Open R DR Osman, L DR Osman, L Elbow Dislocation, and L 4th Digit Base of Distal Phalanx fracture  Ancef 2g given in ED  Plan for OR today for ORIF of b/l distal radius  NPO  IVF while NPO  Analgesia  Hold all chemical DVT ppx for OR today  Slings for comfort  Ice and elevate both extremities as tolerated  NWB B/l UE  Encourage incentive spirometry  Will discuss with attending and advise if plan changes

## 2019-03-21 NOTE — ED ADULT NURSE REASSESSMENT NOTE - NS ED NURSE REASSESS COMMENT FT1
01:30. Pt using incentive spirometer at this time. 1500-2000mL noted. Pt verbalized understanding to use incentive spirometer ten times within one hour.

## 2019-03-21 NOTE — H&P ADULT - HISTORY OF PRESENT ILLNESS
~~~~~ Trauma Surgery ~~~~~    Primary Survey intact, GCS 15  Secondary Survey significant for bilateral upper extremity deformities, tenderness over R chest    Mechanism: 63 year old male presenting as Level II trauma activation following fall backwards off a roof. Patient reports he was working on his friend's roof, trying to address a leak when he fell off the roof, approximately 20 feet, and seems to have struck his face on the bottom of the ladder, however, he had (+) LOC and is uncertain of the details of the event. He was brought in by EMS with bilateral upper extremity splints and c-collar in place. Patient is reporting pain in both arms and his chest; moving all extremities and able to pull 2.5L on incentive spirometry.    Allergies: NKDA  Medications: Provigil  Prior med/surg history: ANDREWS (not on CPAP, taking Provigil); DVT (following jelly sting, s/p coumadin), diverticulitis  Last PO intake: dinner prior to fall

## 2019-03-21 NOTE — H&P ADULT - NSICDXPASTMEDICALHX_GEN_ALL_CORE_FT
PAST MEDICAL HISTORY:  Diverticulitis of large intestine without perforation or abscess without bleeding     DVT (deep venous thrombosis) LLE 2012, treated with coumadin x 6 months.  "I developed DVT after being bitten by a [place] [employer]"    ANDREWS (obstructive sleep apnea) not tolerate of CPAP

## 2019-03-21 NOTE — BRIEF OPERATIVE NOTE - NSICDXBRIEFPOSTOP_GEN_ALL_CORE_FT
POST-OP DIAGNOSIS:  Closed fracture dislocation of distal interphalangeal (DIP) joint of finger 21-Mar-2019 16:06:26 left 4th finger Myranda Bunch  Other type I or II open intra-articular fracture of distal end of right radius 21-Mar-2019 16:06:07  Myranda Bunch  Closed fracture of left distal radius 21-Mar-2019 16:05:40  Myranda Bunch

## 2019-03-21 NOTE — CONSULT NOTE ADULT - SUBJECTIVE AND OBJECTIVE BOX
HPI: 63 year old male presenting as Level II trauma activation following fall backwards off a roof. Patient reports he was working on his friend's roof, trying to address a leak when he fell off the roof, approximately 20 feet, and seems to have struck his face on the bottom of the ladder, however, he had (+) LOC and is uncertain of the details of the event. He was brought in by EMS with bilateral upper extremity splints and c-collar in place. Patient is reporting pain in both arms and his chest; moving all extremities and able to pull 2.5L on incentive spirometry.    Found to have facial fractures plastic surgery consulted for management.    Patient denies changes in vision, nausea, vomiting. Patient endorses subjective malocclusion.    PAST MEDICAL & SURGICAL HISTORY:  Diverticulitis of large intestine without perforation or abscess without bleeding  DVT (deep venous thrombosis): LLE 2012, treated with coumadin x 6 months.  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 no stepoffs or crepitus at infraorbital rim. CN2-12 intact. EOMI. Nasal dorsum appears deviated to the left. lacerations of lower and upper lips. occlusion appears normal.  Chest: comfortable on room air  Ext: moving all 4, arms in splints    .  LABS:                         13.3   9.9   )-----------( 160      ( 20 Mar 2019 22:48 )             40.3     03-20    141  |  103  |  21  ----------------------------<  158<H>  3.4<L>   |  24  |  1.02    Ca    8.9      20 Mar 2019 22:48    TPro  6.8  /  Alb  4.0  /  TBili  0.3  /  DBili  x   /  AST  123<H>  /  ALT  104<H>  /  AlkPhos  71  03-20    PT/INR - ( 20 Mar 2019 22:48 )   PT: 11.2 sec;   INR: 0.97 ratio         PTT - ( 20 Mar 2019 22:48 )  PTT:24.6 sec          RADIOLOGY, EKG & ADDITIONAL TESTS: Reviewed.   < from: CT Maxillofacial No Cont (03.20.19 @ 23:25) >  CT MAXILLOFACIAL BONES: Right-sided zygomaticomaxillary complex fracture   as described. Bilateral nasal bone fractures.    Findings were discussed with Dr. Lopez of trauma surgery by Dr. Stringer on   3/21/2019 at 12:00 AM with read back.                TALISHA STRINGER M.D., RADIOLOGY RESIDENT  This document has been electronically signed.  CARLOS DAWSON M.D., ATTENDING RADIOLOGIST  This document has been electronically signed. Mar 21 2019 12:38AM    < end of copied text >

## 2019-03-21 NOTE — PROGRESS NOTE ADULT - SUBJECTIVE AND OBJECTIVE BOX
ORTHO POST OP CHECK    S: Pt seen and examined. Doing well postoperatively. Pain well controlled.       O:   PE:  Gen: NAD, laying comfortably in bed  Resp: Unlabored breathing  B/L UEs:  Splint Dressings c/d/i, LUE in sling, L 4th finger in splint  SILT axillary/med/rad/ulnar  +Motor AIN/PIN/Ulnar/Radial  Fingers WWP, brisk cap refill                            13.3   9.9   )-----------( 160      ( 20 Mar 2019 22:48 )             40.3     03-20    141  |  103  |  21  ----------------------------<  158<H>  3.4<L>   |  24  |  1.02    Ca    8.9      20 Mar 2019 22:48    TPro  6.8  /  Alb  4.0  /  TBili  0.3  /  DBili  x   /  AST  123<H>  /  ALT  104<H>  /  AlkPhos  71  03-20      Vital Signs Last 24 Hrs  T(C): 37.3 (21 Mar 2019 12:30), Max: 37.4 (21 Mar 2019 06:14)  T(F): 99.1 (21 Mar 2019 12:30), Max: 99.4 (21 Mar 2019 06:14)  HR: 79 (21 Mar 2019 13:00) (73 - 83)  BP: 153/72 (21 Mar 2019 13:00) (112/73 - 169/79)  BP(mean): 104 (21 Mar 2019 13:00) (97 - 106)  RR: 16 (21 Mar 2019 13:00) (16 - 18)  SpO2: 96% (21 Mar 2019 13:00) (94% - 100%)  I&O's Summary      A/P: 63yoM s/p ORIF B/L distal radius fractures, closed reduction L 4th distal phalanx fracture    -Neuro: Multimodal pain control  -Resp: IS  -GI: reg diet  -MSK: OOB, PT/OT, NWB B/L UE, may use right hand to feed himself, Sling to LUE  -Heme: DVT PPx: Lovenox/SCDS  -Transfer to floors when pacu stable

## 2019-03-21 NOTE — PROGRESS NOTE ADULT - SUBJECTIVE AND OBJECTIVE BOX
63M who presents to [facility] with a c/o of b/l wrist pain and L elbow pain. Patient states he was helping his friend on a roof earlier today when he stepped backwards and fell 20 feet to the ground. Admits to headstrike and LOC as he is unable to fully recall the event. Level II trauma code initiated upon arrival to hospital. Denies any numbness or tingling. Admits to previous L arm surgery in 1957 but denies any other orthopedic surgeries. 63M w/ Grade 1 Open R DR Fx, L DR Fx, L Elbow Dislocation, and L 4th Digit Base of Distal Phalanx fracture. Patient seen post op in pacu     PAST MEDICAL & SURGICAL HISTORY:  Diverticulitis of large intestine without perforation or abscess without bleeding  DVT (deep venous thrombosis): LLE 2012, treated with coumadin x 6 months.  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 650 milliGRAM(s) Oral every 6 hours  ceFAZolin   IVPB 2000 milliGRAM(s) IV Intermittent every 8 hours  docusate sodium 100 milliGRAM(s) Oral three times a day  enoxaparin Injectable 40 milliGRAM(s) SubCutaneous every 24 hours  gabapentin 100 milliGRAM(s) Oral three times a day  ketorolac   Injectable 15 milliGRAM(s) IV Push every 6 hours  lidocaine   Patch 1 Patch Transdermal daily  lidocaine   Patch 1 Patch Transdermal daily  sodium chloride 0.9%. 1000 milliLiter(s) (125 mL/Hr) IV Continuous <Continuous>    MEDICATIONS  (PRN):  HYDROmorphone  Injectable 1 milliGRAM(s) IV Push every 6 hours PRN breakthrough  magnesium hydroxide Suspension 30 milliLiter(s) Oral daily PRN Constipation  oxyCODONE    IR 10 milliGRAM(s) Oral every 4 hours PRN Severe Pain (7 - 10)  oxyCODONE    IR 5 milliGRAM(s) Oral every 4 hours PRN Moderate Pain (4 - 6)        CONSTITUTIONAL: No weakness, fevers or chills  EYES/ENT: No visual changes;  No vertigo or throat pain   NECK: No pain or stiffness  RESPIRATORY: No cough, wheezing, hemoptysis; No shortness of breath  CARDIOVASCULAR: No chest pain or palpitations  GASTROINTESTINAL: No abdominal or epigastric pain. No nausea, vomiting, or hematemesis; No diarrhea or constipation. No melena or hematochezia.  GENITOURINARY: No dysuria, frequency or hematuria  NEUROLOGICAL: No numbness or weakness  SKIN: No itching, burning, rashes, or lesions   All other review of systems is negative unless indicated above.    INTERVAL HPI/OVERNIGHT EVENTS:  T(C): 37.2 (03-21-19 @ 18:00), Max: 37.4 (03-21-19 @ 06:14)  HR: 82 (03-21-19 @ 18:00) (73 - 89)  BP: 170/90 (03-21-19 @ 18:00) (112/73 - 170/90)  RR: 18 (03-21-19 @ 18:00) (16 - 18)  SpO2: 97% (03-21-19 @ 18:00) (92% - 100%)  Wt(kg): --  I&O's Summary    21 Mar 2019 07:01  -  21 Mar 2019 18:47  --------------------------------------------------------  IN: 375 mL / OUT: 350 mL / NET: 25 mL        PHYSICAL EXAM:  GENERAL: NAD, well-groomed, well-developed  HEAD:  Atraumatic, Normocephalic  EYES: EOMI, PERRLA, conjunctiva and sclera clear  ENMT: No tonsillar erythema, exudates, or enlargement; Moist mucous membranes, Good dentition, No lesions  NECK: Supple, No JVD, Normal thyroid  NERVOUS SYSTEM:  Alert & Oriented X3, Good concentration; Motor Strength 5/5 B/L upper and lower extremities; DTRs 2+ intact and symmetric  CHEST/LUNG: Clear to percussion bilaterally; No rales, rhonchi, wheezing, or rubs  HEART: Regular rate and rhythm; No murmurs, rubs, or gallops  ABDOMEN: Soft, Nontender, Nondistended; Bowel sounds present  EXTREMITIES:  2+ Peripheral Pulses, No clubbing, cyanosis, or edema  LYMPH: No lymphadenopathy noted  SKIN: No rashes or lesions        LABS:                        12.3   9.6   )-----------( 121      ( 21 Mar 2019 14:04 )             37.0     03-21    140  |  102  |  24<H>  ----------------------------<  120<H>  4.0   |  25  |  0.94    Ca    8.7      21 Mar 2019 14:04    TPro  6.8  /  Alb  4.0  /  TBili  0.3  /  DBili  x   /  AST  123<H>  /  ALT  104<H>  /  AlkPhos  71  03-20    PT/INR - ( 20 Mar 2019 22:48 )   PT: 11.2 sec;   INR: 0.97 ratio         PTT - ( 20 Mar 2019 22:48 )  PTT:24.6 sec    CAPILLARY BLOOD GLUCOSE                Radiology reports:

## 2019-03-21 NOTE — PRE-OP CHECKLIST - ANTIBIOTIC
LABS:                        15.7   9.3   )-----------( 176      ( 11 Jun 2017 18:41 )             45.9     06-11    140  |  101  |  11  ----------------------------<  122<H>  4.2   |  26  |  0.73    Ca    9.6      11 Jun 2017 18:41    TPro  7.5  /  Alb  3.8  /  TBili  0.3  /  DBili  x   /  AST  21  /  ALT  19  /  AlkPhos  64  06-11    PT/INR - ( 11 Jun 2017 18:41 )   PT: 11.9 sec;   INR: 1.10 ratio         PTT - ( 11 Jun 2017 18:41 )  PTT:29.0 sec    CEA: 3  CA-125: 16    CXR (6/9): clear lungs  TVUS (6/9): uterus measures 6.2 x 2.3 x 3.3 cm, 0.2cm endometrium, R ov measures 14.4 x 16.0 x 19.1 cm. Cysts measuring 14.2 x 8.9 x 15.3 cm and 6.7 x 4.5 x 6.7 cm. Flow present. Left ov unremarkable.  CT Scan (6/9): large cystic lesion in the right adnexa measuring up to 15.2 x 14.7 x 15.5 cm. Unremarkable uterus and left  adnexa.
no (get order)

## 2019-03-22 LAB
ANION GAP SERPL CALC-SCNC: 11 MMOL/L — SIGNIFICANT CHANGE UP (ref 5–17)
BUN SERPL-MCNC: 22 MG/DL — SIGNIFICANT CHANGE UP (ref 7–23)
CALCIUM SERPL-MCNC: 8.2 MG/DL — LOW (ref 8.4–10.5)
CHLORIDE SERPL-SCNC: 102 MMOL/L — SIGNIFICANT CHANGE UP (ref 96–108)
CO2 SERPL-SCNC: 26 MMOL/L — SIGNIFICANT CHANGE UP (ref 22–31)
CREAT SERPL-MCNC: 0.94 MG/DL — SIGNIFICANT CHANGE UP (ref 0.5–1.3)
GLUCOSE SERPL-MCNC: 112 MG/DL — HIGH (ref 70–99)
HCT VFR BLD CALC: 33.5 % — LOW (ref 39–50)
HGB BLD-MCNC: 11.2 G/DL — LOW (ref 13–17)
MAGNESIUM SERPL-MCNC: 1.9 MG/DL — SIGNIFICANT CHANGE UP (ref 1.6–2.6)
MCHC RBC-ENTMCNC: 31.9 PG — SIGNIFICANT CHANGE UP (ref 27–34)
MCHC RBC-ENTMCNC: 33.4 GM/DL — SIGNIFICANT CHANGE UP (ref 32–36)
MCV RBC AUTO: 95.7 FL — SIGNIFICANT CHANGE UP (ref 80–100)
PHOSPHATE SERPL-MCNC: 1.8 MG/DL — LOW (ref 2.5–4.5)
PLATELET # BLD AUTO: ABNORMAL (ref 150–400)
POTASSIUM SERPL-MCNC: 3.7 MMOL/L — SIGNIFICANT CHANGE UP (ref 3.5–5.3)
POTASSIUM SERPL-SCNC: 3.7 MMOL/L — SIGNIFICANT CHANGE UP (ref 3.5–5.3)
RBC # BLD: 3.49 M/UL — LOW (ref 4.2–5.8)
RBC # FLD: 13 % — SIGNIFICANT CHANGE UP (ref 10.3–14.5)
SODIUM SERPL-SCNC: 139 MMOL/L — SIGNIFICANT CHANGE UP (ref 135–145)
WBC # BLD: 8.7 K/UL — SIGNIFICANT CHANGE UP (ref 3.8–10.5)
WBC # FLD AUTO: 8.7 K/UL — SIGNIFICANT CHANGE UP (ref 3.8–10.5)

## 2019-03-22 PROCEDURE — 76377 3D RENDER W/INTRP POSTPROCES: CPT | Mod: 26

## 2019-03-22 PROCEDURE — 71045 X-RAY EXAM CHEST 1 VIEW: CPT | Mod: 26

## 2019-03-22 PROCEDURE — 73200 CT UPPER EXTREMITY W/O DYE: CPT | Mod: 26,LT

## 2019-03-22 RX ORDER — SODIUM CHLORIDE 0.65 %
1 AEROSOL, SPRAY (ML) NASAL EVERY 6 HOURS
Qty: 0 | Refills: 0 | Status: DISCONTINUED | OUTPATIENT
Start: 2019-03-22 | End: 2019-03-26

## 2019-03-22 RX ORDER — SODIUM,POTASSIUM PHOSPHATES 278-250MG
1 POWDER IN PACKET (EA) ORAL
Qty: 0 | Refills: 0 | Status: DISCONTINUED | OUTPATIENT
Start: 2019-03-22 | End: 2019-03-22

## 2019-03-22 RX ORDER — SODIUM,POTASSIUM PHOSPHATES 278-250MG
1 POWDER IN PACKET (EA) ORAL
Qty: 0 | Refills: 0 | Status: COMPLETED | OUTPATIENT
Start: 2019-03-22 | End: 2019-03-24

## 2019-03-22 RX ORDER — POTASSIUM PHOSPHATE, MONOBASIC POTASSIUM PHOSPHATE, DIBASIC 236; 224 MG/ML; MG/ML
30 INJECTION, SOLUTION INTRAVENOUS ONCE
Qty: 0 | Refills: 0 | Status: COMPLETED | OUTPATIENT
Start: 2019-03-22 | End: 2019-03-22

## 2019-03-22 RX ADMIN — GABAPENTIN 100 MILLIGRAM(S): 400 CAPSULE ORAL at 14:17

## 2019-03-22 RX ADMIN — LIDOCAINE 1 PATCH: 4 CREAM TOPICAL at 11:44

## 2019-03-22 RX ADMIN — Medication 1 SPRAY(S): at 21:07

## 2019-03-22 RX ADMIN — Medication 650 MILLIGRAM(S): at 23:14

## 2019-03-22 RX ADMIN — Medication 1 PACKET(S): at 18:17

## 2019-03-22 RX ADMIN — LIDOCAINE 1 PATCH: 4 CREAM TOPICAL at 23:25

## 2019-03-22 RX ADMIN — ENOXAPARIN SODIUM 40 MILLIGRAM(S): 100 INJECTION SUBCUTANEOUS at 18:17

## 2019-03-22 RX ADMIN — Medication 15 MILLIGRAM(S): at 06:01

## 2019-03-22 RX ADMIN — Medication 100 MILLIGRAM(S): at 10:00

## 2019-03-22 RX ADMIN — Medication 650 MILLIGRAM(S): at 12:14

## 2019-03-22 RX ADMIN — OXYCODONE HYDROCHLORIDE 10 MILLIGRAM(S): 5 TABLET ORAL at 21:07

## 2019-03-22 RX ADMIN — Medication 650 MILLIGRAM(S): at 11:44

## 2019-03-22 RX ADMIN — Medication 100 MILLIGRAM(S): at 00:30

## 2019-03-22 RX ADMIN — Medication 100 MILLIGRAM(S): at 06:01

## 2019-03-22 RX ADMIN — Medication 15 MILLIGRAM(S): at 11:44

## 2019-03-22 RX ADMIN — Medication 650 MILLIGRAM(S): at 18:48

## 2019-03-22 RX ADMIN — POTASSIUM PHOSPHATE, MONOBASIC POTASSIUM PHOSPHATE, DIBASIC 83.33 MILLIMOLE(S): 236; 224 INJECTION, SOLUTION INTRAVENOUS at 11:39

## 2019-03-22 RX ADMIN — Medication 15 MILLIGRAM(S): at 06:31

## 2019-03-22 RX ADMIN — GABAPENTIN 100 MILLIGRAM(S): 400 CAPSULE ORAL at 06:01

## 2019-03-22 RX ADMIN — Medication 100 MILLIGRAM(S): at 18:18

## 2019-03-22 RX ADMIN — GABAPENTIN 100 MILLIGRAM(S): 400 CAPSULE ORAL at 21:09

## 2019-03-22 RX ADMIN — LIDOCAINE 1 PATCH: 4 CREAM TOPICAL at 11:43

## 2019-03-22 RX ADMIN — Medication 15 MILLIGRAM(S): at 00:16

## 2019-03-22 RX ADMIN — Medication 15 MILLIGRAM(S): at 12:00

## 2019-03-22 RX ADMIN — LIDOCAINE 1 PATCH: 4 CREAM TOPICAL at 19:12

## 2019-03-22 RX ADMIN — OXYCODONE HYDROCHLORIDE 10 MILLIGRAM(S): 5 TABLET ORAL at 21:37

## 2019-03-22 RX ADMIN — Medication 650 MILLIGRAM(S): at 18:18

## 2019-03-22 RX ADMIN — Medication 100 MILLIGRAM(S): at 23:14

## 2019-03-22 RX ADMIN — Medication 100 MILLIGRAM(S): at 14:17

## 2019-03-22 RX ADMIN — Medication 650 MILLIGRAM(S): at 23:44

## 2019-03-22 RX ADMIN — Medication 100 MILLIGRAM(S): at 21:09

## 2019-03-22 RX ADMIN — LIDOCAINE 1 PATCH: 4 CREAM TOPICAL at 19:11

## 2019-03-22 NOTE — PROGRESS NOTE ADULT - SUBJECTIVE AND OBJECTIVE BOX
S: Pt seen and examined. Doing well postoperatively. Pain well controlled.       O:   PE:  Gen: NAD, laying comfortably in bed  Resp: Unlabored breathing  B/L UEs:  Splint Dressings c/d/i, LUE in sling, L 4th finger in splint  SILT axillary/med/rad/ulnar  +Motor AIN/PIN/Ulnar/Radial  Fingers WWP, brisk cap refill    Vital Signs Last 24 Hrs  T(C): 37.3 (22 Mar 2019 05:37), Max: 37.5 (21 Mar 2019 23:55)  T(F): 99.2 (22 Mar 2019 05:37), Max: 99.5 (21 Mar 2019 23:55)  HR: 88 (22 Mar 2019 05:37) (73 - 89)  BP: 161/89 (22 Mar 2019 05:37) (112/73 - 170/90)  BP(mean): 100 (21 Mar 2019 17:00) (86 - 106)  RR: 18 (22 Mar 2019 05:37) (16 - 18)  SpO2: 97% (22 Mar 2019 05:37) (92% - 100%)                A/P: 63yoM s/p ORIF B/L distal radius fractures, closed reduction L 4th distal phalanx fracture    -Neuro: Multimodal pain control  -Resp: IS  -GI: reg diet  -MSK: OOB, PT/OT, NWB B/L UE, may use right hand to feed himself, Sling to LUE  -L 4th digit fx/dxn, as well as L elbow dislocation to be addressed outpt with Dr Cavanaugh  -Heme: DVT PPx: Lovenox/SCDS  -care per primary team  -Dispo planning

## 2019-03-22 NOTE — OCCUPATIONAL THERAPY INITIAL EVALUATION ADULT - ANTICIPATED DISCHARGE DISPOSITION, OT EVAL
home OT for ADLs and safety assessment in home environment. Assist with ADLs as needed, pt states wife is able to assist./home w/ OT

## 2019-03-22 NOTE — PROGRESS NOTE ADULT - ASSESSMENT
63M s/p fall from roof with right ZMC and nasal bone fractures    -bacitracin to lip suture lines BID  -no plastic surgery intervention for fractures at this time  -follow up with Dr. Fernando on Tuesday 3/26/19, you may call [phone] to schedule an appointment.   -care per primary  -call back with questions

## 2019-03-22 NOTE — PROGRESS NOTE ADULT - SUBJECTIVE AND OBJECTIVE BOX
S: pt seen and examined. went to OR with ortho yesterday    O;T(C): 37.3 (03-22-19 @ 05:37), Max: 37.5 (03-21-19 @ 23:55)  HR: 88 (03-22-19 @ 05:37) (73 - 89)  BP: 161/89 (03-22-19 @ 05:37) (118/76 - 170/90)  RR: 18 (03-22-19 @ 05:37) (16 - 18)  SpO2: 97% (03-22-19 @ 05:37) (92% - 100%)  Wt(kg): --  03-21 @ 07:01  -  03-22 @ 07:00  --------------------------------------------------------  IN:    IV PiggyBack: 50 mL    Oral Fluid: 710 mL    sodium chloride 0.9%.: 1502 mL  Total IN: 2262 mL    OUT:    Voided: 1400 mL  Total OUT: 1400 mL    Total NET: 862 mL      Gen NAD  HEENT: improving right periorbital edema; stable ecchymosis; EOMI

## 2019-03-22 NOTE — PROGRESS NOTE ADULT - ATTENDING COMMENTS
I am a non participating [facility] physician seeing. Pt in coverage for Dr Zarate. The above patient examination was reviewed with Dr. Wu and I agree with his evaluation, assessment and treatment plan.  Mark Zarate M.D.

## 2019-03-22 NOTE — PROGRESS NOTE ADULT - SUBJECTIVE AND OBJECTIVE BOX
S: Pt seen and examined.    Vital Signs Last 24 Hrs  T(C): 37.3 (22 Mar 2019 05:37), Max: 37.5 (21 Mar 2019 23:55)  T(F): 99.2 (22 Mar 2019 05:37), Max: 99.5 (21 Mar 2019 23:55)  HR: 88 (22 Mar 2019 05:37) (73 - 89)  BP: 161/89 (22 Mar 2019 05:37) (118/76 - 170/90)  BP(mean): 100 (21 Mar 2019 17:00) (86 - 106)  RR: 18 (22 Mar 2019 05:37) (16 - 18)  SpO2: 97% (22 Mar 2019 05:37) (92% - 100%)      	General: well developed, well groomed, NAD  	Neuro: alert and oriented, no focal deficits, moves all extremities spontaneously  	HEENT: marked swelling over R face, nontender, PERRL (2mm), EOMI, mucosa moist  	Respiratory: airway patent, respirations unlabored  	Back: no step off, deformity or discoloration  	CVS: regular rate and rhythm  	Chest: tender on R anterior chest, no deformity  	Abdomen: soft, nontender, nondistended  	Pelvis/: intact, stable, grossly normal tone  	Extremities: bilateral distal upper extremity deformities, with intact skin; sensation and movement grossly intact; lower extremities without angulation, discoloration or deformity  	Vascular: bilateral palpable radial and DP pulses  Skin: warm, dry, appropriate color  I&O's Summary    21 Mar 2019 07:01  -  22 Mar 2019 06:35  --------------------------------------------------------  IN: 2262 mL / OUT: 1400 mL / NET: 862 mL      I&O's Detail    21 Mar 2019 07:01  -  22 Mar 2019 06:35  --------------------------------------------------------  IN:    IV PiggyBack: 50 mL    Oral Fluid: 710 mL    sodium chloride 0.9%.: 1502 mL  Total IN: 2262 mL    OUT:    Voided: 1400 mL  Total OUT: 1400 mL    Total NET: 862 mL          MEDICATIONS  (STANDING):  acetaminophen   Tablet .. 650 milliGRAM(s) Oral every 6 hours  ceFAZolin   IVPB 2000 milliGRAM(s) IV Intermittent every 8 hours  docusate sodium 100 milliGRAM(s) Oral three times a day  enoxaparin Injectable 40 milliGRAM(s) SubCutaneous every 24 hours  gabapentin 100 milliGRAM(s) Oral three times a day  ketorolac   Injectable 15 milliGRAM(s) IV Push every 6 hours  lidocaine   Patch 1 Patch Transdermal daily  lidocaine   Patch 1 Patch Transdermal daily  sodium chloride 0.9%. 1000 milliLiter(s) (125 mL/Hr) IV Continuous <Continuous>    MEDICATIONS  (PRN):  HYDROmorphone  Injectable 1 milliGRAM(s) IV Push every 6 hours PRN breakthrough  magnesium hydroxide Suspension 30 milliLiter(s) Oral daily PRN Constipation  oxyCODONE    IR 10 milliGRAM(s) Oral every 4 hours PRN Severe Pain (7 - 10)  oxyCODONE    IR 5 milliGRAM(s) Oral every 4 hours PRN Moderate Pain (4 - 6)      LABS:                        12.3   9.6   )-----------( 121      ( 21 Mar 2019 14:04 )             37.0     03-21    140  |  102  |  24<H>  ----------------------------<  120<H>  4.0   |  25  |  0.94    Ca    8.7      21 Mar 2019 14:04    TPro  6.8  /  Alb  4.0  /  TBili  0.3  /  DBili  x   /  AST  123<H>  /  ALT  104<H>  /  AlkPhos  71  03-20    PT/INR - ( 20 Mar 2019 22:48 )   PT: 11.2 sec;   INR: 0.97 ratio         PTT - ( 20 Mar 2019 22:48 )  PTT:24.6 sec S:  POD #1 s/p irrigation and debridement of wrist and ORIF of b/l distal radius and ulna bones. Pt seen and examined. Denies numbness tingling of extremities. Reports pain improved with pain meds.    Vital Signs Last 24 Hrs  T(C): 37.3 (22 Mar 2019 05:37), Max: 37.5 (21 Mar 2019 23:55)  T(F): 99.2 (22 Mar 2019 05:37), Max: 99.5 (21 Mar 2019 23:55)  HR: 88 (22 Mar 2019 05:37) (73 - 89)  BP: 161/89 (22 Mar 2019 05:37) (118/76 - 170/90)  BP(mean): 100 (21 Mar 2019 17:00) (86 - 106)  RR: 18 (22 Mar 2019 05:37) (16 - 18)  SpO2: 97% (22 Mar 2019 05:37) (92% - 100%)      	General: well developed, well groomed, NAD  	Neuro: alert and oriented, no focal deficits, moves all extremities spontaneously  	HEENT: marked swelling over R face, nontender, PERRL (2mm), EOMI, mucosa moist  	Respiratory: airway patent, respirations unlabored  	Chest: tender on R anterior chest, no deformity  	Abdomen: soft, nontender, nondistended    B/L UEs: Splint Dressings c/d/i, LUE in sling, L 4th finger in splint, SILT axillary/med/rad/ulnar,+Motor AIN/PIN/Ulnar/Radial  Fingers WWP, brisk cap refill  	Vascular: bilateral palpable radial and DP pulses    Skin: warm, dry, appropriate color      I&O's Summary    21 Mar 2019 07:01  -  22 Mar 2019 06:35  --------------------------------------------------------  IN: 2262 mL / OUT: 1400 mL / NET: 862 mL      I&O's Detail    21 Mar 2019 07:01  -  22 Mar 2019 06:35  --------------------------------------------------------  IN:    IV PiggyBack: 50 mL    Oral Fluid: 710 mL    sodium chloride 0.9%.: 1502 mL  Total IN: 2262 mL    OUT:    Voided: 1400 mL  Total OUT: 1400 mL    Total NET: 862 mL          MEDICATIONS  (STANDING):  acetaminophen   Tablet .. 650 milliGRAM(s) Oral every 6 hours  ceFAZolin   IVPB 2000 milliGRAM(s) IV Intermittent every 8 hours  docusate sodium 100 milliGRAM(s) Oral three times a day  enoxaparin Injectable 40 milliGRAM(s) SubCutaneous every 24 hours  gabapentin 100 milliGRAM(s) Oral three times a day  ketorolac   Injectable 15 milliGRAM(s) IV Push every 6 hours  lidocaine   Patch 1 Patch Transdermal daily  lidocaine   Patch 1 Patch Transdermal daily  sodium chloride 0.9%. 1000 milliLiter(s) (125 mL/Hr) IV Continuous <Continuous>    MEDICATIONS  (PRN):  HYDROmorphone  Injectable 1 milliGRAM(s) IV Push every 6 hours PRN breakthrough  magnesium hydroxide Suspension 30 milliLiter(s) Oral daily PRN Constipation  oxyCODONE    IR 10 milliGRAM(s) Oral every 4 hours PRN Severe Pain (7 - 10)  oxyCODONE    IR 5 milliGRAM(s) Oral every 4 hours PRN Moderate Pain (4 - 6)      LABS:                        12.3   9.6   )-----------( 121      ( 21 Mar 2019 14:04 )             37.0     03-21    140  |  102  |  24<H>  ----------------------------<  120<H>  4.0   |  25  |  0.94    Ca    8.7      21 Mar 2019 14:04    TPro  6.8  /  Alb  4.0  /  TBili  0.3  /  DBili  x   /  AST  123<H>  /  ALT  104<H>  /  AlkPhos  71  03-20    PT/INR - ( 20 Mar 2019 22:48 )   PT: 11.2 sec;   INR: 0.97 ratio         PTT - ( 20 Mar 2019 22:48 )  PTT:24.6 sec

## 2019-03-22 NOTE — PROGRESS NOTE ADULT - SUBJECTIVE AND OBJECTIVE BOX
COmfortable  moving finger well      PLAN: CT left elbow then discharge home when stable.    F/U Dr Reuben Bermudez this week for finger

## 2019-03-22 NOTE — OCCUPATIONAL THERAPY INITIAL EVALUATION ADULT - LIVES WITH, PROFILE
Pt lives with spouse in private home with 10 steps to enter, flight to bedroom, walk in shower. Pt I in ADLs and ambulation prior to admission
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
## 2019-03-22 NOTE — OCCUPATIONAL THERAPY INITIAL EVALUATION ADULT - PRECAUTIONS/LIMITATIONS, REHAB EVAL
fall precautions/He was brought in by EMS with BUE splints and c-collar in place. Pt is reporting pain in both arms and his chest; moving all extremities and able to pull 2.5L on incentive spirometry.

## 2019-03-22 NOTE — PHYSICAL THERAPY INITIAL EVALUATION ADULT - ADDITIONAL COMMENTS
pt has a flight of steps in his home, a few steps to enter. community ambulator no device. pt states he owns a tempur pedic bed and a recliner to sleep in .

## 2019-03-22 NOTE — OCCUPATIONAL THERAPY INITIAL EVALUATION ADULT - ADDITIONAL COMMENTS
Xray L Elbow: Left elbow dislocation with elbow fracture of uncertain donor site. Comminuted impacted intra-articular distal radial metaphyseal fracture. [employer] volar angulation of the fracture fragments.  Xray R Humerus: Acute, mildly displaced and impacted fracture of the distal radial metaphysis. Mild dorsal attenuation of the distal fracture fragment. No intra-articular extension. Slight radial subluxation of the radius relative to the capitellum. No abilio dislocation. No acute fracture or dislocation of the right shoulder, humerus, proximal forearm, and hand. Xray L Elbow: L elbow dislocation with elbow fracture of uncertain donor site. Comminuted impacted intra-articular distal radial metaphyseal fracture. [place] volar angulation of the fracture fragments.  Xray R Humerus: Acute, mildly displaced and impacted fracture of the distal radial metaphysis. Mild dorsal attenuation of the distal fracture fragment. No intra-articular extension. Slight radial subluxation of the radius relative to the capitellum. No abilio dislocation. No acute fracture or dislocation of the R shoulder, humerus, proximal forearm, and hand.  CT Head: (-), CT C Spine: (-), CT Maxillofacial Bones: Right-sided zygomaticomaxillary complex fracture as described. Bilateral nasal bone fractures.  CT Abd/Pelvis: No acute intrathoracic, intra-abdominal or pelvic solid organ, visceral or vascular injury, given the extent of artifact. Multiple bilateral rib fractures as described.

## 2019-03-22 NOTE — OCCUPATIONAL THERAPY INITIAL EVALUATION ADULT - ASR WT BEARING STATUS EVAL
Left UE/Pt s/p ORIF B/L distal radius fractures, closed reduction L 4th distal phalanx fracture/Right UE

## 2019-03-22 NOTE — CHART NOTE - NSCHARTNOTEFT_GEN_A_CORE
Fit and apply Blair hinged elbow orthosis set at -20 degrees extension to full flexion. Reviewed application skin precautions and care with patient and family. Written instructions and contact information given. To notify office with any issues questions or concerns.  Paulo HOWELL  [facility]  [phone]

## 2019-03-22 NOTE — PROGRESS NOTE ADULT - ASSESSMENT
63 year old male s/p fall from 20 feet, hemodynamically stable, with multiple facial fracture, bilateral distal radius fractures, and bilateral rib fractures (R 5-9 and L 4-6).    - multimodal analgesia with tylenol, ibuprofen, oxycodone, and lidoderm  - appreciate orthopedics and face fracture consultations  -f/u Ophtho  -f/u Ortho  -f/u plastics    [phone] 63 year old male s/p fall from 20 feet, hemodynamically stable, with multiple facial fracture, bilateral distal radius fractures, and bilateral rib fractures (R 5-9 and L 4-6).    - multimodal analgesia with tylenol, ibuprofen, oxycodone, and lidoderm  - f/u Ophtho-right orbital floor fracture, right ZMC fracture. Clinically, globe intact, no evidence of extraocular muscle entrapment.  no nose blowing, sneezing, heavy lifting, straining- ice packs to right eye, Nasal Decongestants BID, PO Antibiotics x 7-10 days,  follow-Up:  Patient should follow up his/her ophthalmologist or in the [facility] within 1 week of discharge.  - f/u Ortho PLAN: CT left elbow then discharge home when stable.  - f/u plastics, -bacitracin to lip suture lines BID-no plastic surgery intervention for fractures at this time,-follow up with Dr. Fernando on Tuesday 3/26/19, you may call [phone] to schedule an appointment.     [phone]

## 2019-03-22 NOTE — PHYSICAL THERAPY INITIAL EVALUATION ADULT - PHYSICAL ASSIST/NONPHYSICAL ASSIST: GAIT, REHAB EVAL
verbal cues/nonverbal cues (demo/gestures)/chair follow for a 2nd person/1 person + 1 person to manage equipment

## 2019-03-22 NOTE — PHYSICAL THERAPY INITIAL EVALUATION ADULT - PLANNED THERAPY INTERVENTIONS, PT EVAL
transfer training/gait training/bed mobility training/GOAL: Pt will perform 10 stairs with U HR as needed within 3-4weeks.
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
## 2019-03-23 LAB
ANION GAP SERPL CALC-SCNC: 10 MMOL/L — SIGNIFICANT CHANGE UP (ref 5–17)
BUN SERPL-MCNC: 16 MG/DL — SIGNIFICANT CHANGE UP (ref 7–23)
CALCIUM SERPL-MCNC: 8.6 MG/DL — SIGNIFICANT CHANGE UP (ref 8.4–10.5)
CHLORIDE SERPL-SCNC: 102 MMOL/L — SIGNIFICANT CHANGE UP (ref 96–108)
CO2 SERPL-SCNC: 26 MMOL/L — SIGNIFICANT CHANGE UP (ref 22–31)
CREAT SERPL-MCNC: 0.85 MG/DL — SIGNIFICANT CHANGE UP (ref 0.5–1.3)
GLUCOSE SERPL-MCNC: 117 MG/DL — HIGH (ref 70–99)
HCT VFR BLD CALC: 32.5 % — LOW (ref 39–50)
HGB BLD-MCNC: 10.6 G/DL — LOW (ref 13–17)
MAGNESIUM SERPL-MCNC: 2 MG/DL — SIGNIFICANT CHANGE UP (ref 1.6–2.6)
MCHC RBC-ENTMCNC: 30.8 PG — SIGNIFICANT CHANGE UP (ref 27–34)
MCHC RBC-ENTMCNC: 32.6 GM/DL — SIGNIFICANT CHANGE UP (ref 32–36)
MCV RBC AUTO: 94.5 FL — SIGNIFICANT CHANGE UP (ref 80–100)
PHOSPHATE SERPL-MCNC: 2.1 MG/DL — LOW (ref 2.5–4.5)
PLATELET # BLD AUTO: 85 K/UL — LOW (ref 150–400)
POTASSIUM SERPL-MCNC: 4 MMOL/L — SIGNIFICANT CHANGE UP (ref 3.5–5.3)
POTASSIUM SERPL-SCNC: 4 MMOL/L — SIGNIFICANT CHANGE UP (ref 3.5–5.3)
RBC # BLD: 3.44 M/UL — LOW (ref 4.2–5.8)
RBC # FLD: 14.3 % — SIGNIFICANT CHANGE UP (ref 10.3–14.5)
SODIUM SERPL-SCNC: 138 MMOL/L — SIGNIFICANT CHANGE UP (ref 135–145)
WBC # BLD: 9.12 K/UL — SIGNIFICANT CHANGE UP (ref 3.8–10.5)
WBC # FLD AUTO: 9.12 K/UL — SIGNIFICANT CHANGE UP (ref 3.8–10.5)

## 2019-03-23 PROCEDURE — 99232 SBSQ HOSP IP/OBS MODERATE 35: CPT

## 2019-03-23 RX ORDER — DOCUSATE SODIUM 100 MG
100 CAPSULE ORAL THREE TIMES A DAY
Qty: 0 | Refills: 0 | Status: DISCONTINUED | OUTPATIENT
Start: 2019-03-23 | End: 2019-03-26

## 2019-03-23 RX ORDER — HYDROMORPHONE HYDROCHLORIDE 2 MG/ML
0.5 INJECTION INTRAMUSCULAR; INTRAVENOUS; SUBCUTANEOUS EVERY 6 HOURS
Qty: 0 | Refills: 0 | Status: DISCONTINUED | OUTPATIENT
Start: 2019-03-23 | End: 2019-03-23

## 2019-03-23 RX ORDER — HYDROCORTISONE 1 %
1 OINTMENT (GRAM) TOPICAL ONCE
Qty: 0 | Refills: 0 | Status: COMPLETED | OUTPATIENT
Start: 2019-03-23 | End: 2019-03-24

## 2019-03-23 RX ORDER — TRAMADOL HYDROCHLORIDE 50 MG/1
50 TABLET ORAL EVERY 6 HOURS
Qty: 0 | Refills: 0 | Status: DISCONTINUED | OUTPATIENT
Start: 2019-03-23 | End: 2019-03-26

## 2019-03-23 RX ORDER — ACETAMINOPHEN 500 MG
975 TABLET ORAL EVERY 6 HOURS
Qty: 0 | Refills: 0 | Status: DISCONTINUED | OUTPATIENT
Start: 2019-03-23 | End: 2019-03-26

## 2019-03-23 RX ORDER — DOCUSATE SODIUM 100 MG
300 CAPSULE ORAL THREE TIMES A DAY
Qty: 0 | Refills: 0 | Status: DISCONTINUED | OUTPATIENT
Start: 2019-03-23 | End: 2019-03-23

## 2019-03-23 RX ORDER — GABAPENTIN 400 MG/1
300 CAPSULE ORAL THREE TIMES A DAY
Qty: 0 | Refills: 0 | Status: DISCONTINUED | OUTPATIENT
Start: 2019-03-23 | End: 2019-03-26

## 2019-03-23 RX ORDER — ACETAMINOPHEN 500 MG
1000 TABLET ORAL ONCE
Qty: 0 | Refills: 0 | Status: COMPLETED | OUTPATIENT
Start: 2019-03-23 | End: 2019-03-23

## 2019-03-23 RX ADMIN — Medication 650 MILLIGRAM(S): at 07:26

## 2019-03-23 RX ADMIN — LIDOCAINE 1 PATCH: 4 CREAM TOPICAL at 18:05

## 2019-03-23 RX ADMIN — Medication 100 MILLIGRAM(S): at 12:53

## 2019-03-23 RX ADMIN — LIDOCAINE 1 PATCH: 4 CREAM TOPICAL at 12:52

## 2019-03-23 RX ADMIN — Medication 62.5 MILLIMOLE(S): at 15:40

## 2019-03-23 RX ADMIN — OXYCODONE HYDROCHLORIDE 10 MILLIGRAM(S): 5 TABLET ORAL at 22:04

## 2019-03-23 RX ADMIN — Medication 1000 MILLIGRAM(S): at 13:10

## 2019-03-23 RX ADMIN — LIDOCAINE 1 PATCH: 4 CREAM TOPICAL at 12:53

## 2019-03-23 RX ADMIN — GABAPENTIN 300 MILLIGRAM(S): 400 CAPSULE ORAL at 22:14

## 2019-03-23 RX ADMIN — OXYCODONE HYDROCHLORIDE 10 MILLIGRAM(S): 5 TABLET ORAL at 07:15

## 2019-03-23 RX ADMIN — Medication 650 MILLIGRAM(S): at 18:12

## 2019-03-23 RX ADMIN — Medication 100 MILLIGRAM(S): at 06:56

## 2019-03-23 RX ADMIN — OXYCODONE HYDROCHLORIDE 10 MILLIGRAM(S): 5 TABLET ORAL at 22:30

## 2019-03-23 RX ADMIN — GABAPENTIN 100 MILLIGRAM(S): 400 CAPSULE ORAL at 06:56

## 2019-03-23 RX ADMIN — Medication 100 MILLIGRAM(S): at 22:03

## 2019-03-23 RX ADMIN — Medication 1 PACKET(S): at 06:56

## 2019-03-23 RX ADMIN — Medication 650 MILLIGRAM(S): at 06:56

## 2019-03-23 RX ADMIN — OXYCODONE HYDROCHLORIDE 10 MILLIGRAM(S): 5 TABLET ORAL at 16:26

## 2019-03-23 RX ADMIN — LIDOCAINE 1 PATCH: 4 CREAM TOPICAL at 18:04

## 2019-03-23 RX ADMIN — Medication 650 MILLIGRAM(S): at 18:49

## 2019-03-23 RX ADMIN — GABAPENTIN 100 MILLIGRAM(S): 400 CAPSULE ORAL at 12:53

## 2019-03-23 RX ADMIN — OXYCODONE HYDROCHLORIDE 10 MILLIGRAM(S): 5 TABLET ORAL at 15:40

## 2019-03-23 RX ADMIN — OXYCODONE HYDROCHLORIDE 10 MILLIGRAM(S): 5 TABLET ORAL at 07:45

## 2019-03-23 RX ADMIN — ENOXAPARIN SODIUM 40 MILLIGRAM(S): 100 INJECTION SUBCUTANEOUS at 18:11

## 2019-03-23 RX ADMIN — Medication 1 PACKET(S): at 18:12

## 2019-03-23 RX ADMIN — Medication 400 MILLIGRAM(S): at 12:53

## 2019-03-23 NOTE — PROGRESS NOTE ADULT - ASSESSMENT
63 year old male s/p fall from 20 feet, hemodynamically stable, with multiple facial fracture, bilateral distal radius fractures, and bilateral rib fractures (R 5-9 and L 4-6).    - multimodal analgesia with tylenol, ibuprofen, oxycodone, and lidoderm  - f/u Ophtho-right orbital floor fracture, right ZMC fracture. Clinically, globe intact, no evidence of extraocular muscle entrapment.  no nose blowing, sneezing, heavy lifting, straining- ice packs to right eye, Nasal Decongestants BID, PO Antibiotics x 7-10 days,  follow-Up:  Patient should follow up his/her ophthalmologist or in the [facility] Practice within 1 week of discharge.  - f/u Ortho PLAN: CT left elbow then discharge home when stable.  - f/u plastics, -bacitracin to lip suture lines BID-no plastic surgery intervention for fractures at this time,-follow up with Dr. Fernando on Tuesday 3/26/19, you may call [phone] to schedule an appointment.   - PT/OT-->home with home PT and OT  - likely d/c today    [id number]

## 2019-03-23 NOTE — PROGRESS NOTE ADULT - ATTENDING COMMENTS
sitting in chair  still significant discomfort   reviewed multimodal pain control  evaluation by physical therapy in progress
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
## 2019-03-23 NOTE — PROGRESS NOTE ADULT - ATTENDING COMMENTS
I am a non participating [facility] physician seeing. Pt in coverage for Dr Zarate. The above patient examination was reviewed with Dr. Wu and I agree with his evaluation, assessment and treatment plan.  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 Remain alert and responsive and is taking limited narcotics, to prevent excessive sedation.  Grade 1 Open R DR Fx, L DR Fx, L Elbow Dislocation, and L 4th Digit Base of Distal Phalanx fracture. Left arm with increasing edema. No medical complications post-op to date.  Continues pulmonary toilet to lessen atelectasis, leg exercises as taught to lessen the risk of DVT and supervised pain medications for post-op pain control.

## 2019-03-23 NOTE — PROGRESS NOTE ADULT - SUBJECTIVE AND OBJECTIVE BOX
Surgery Progress Note    S: Patient seen and examined. No acute events overnight. Patient was fitted with a new L elbow brace yesterday. Pain is well controlled. Tolerating diet well.     O:  Vital Signs Last 24 Hrs  T(C): 37.2 (23 Mar 2019 00:04), Max: 37.7 (22 Mar 2019 18:50)  T(F): 99 (23 Mar 2019 00:04), Max: 99.8 (22 Mar 2019 18:50)  HR: 81 (23 Mar 2019 00:04) (75 - 100)  BP: 132/74 (23 Mar 2019 00:04) (78/43 - 161/89)  BP(mean): --  RR: 8 (23 Mar 2019 00:04) (8 - 18)  SpO2: 95% (23 Mar 2019 00:04) (92% - 99%)    I&O's Detail    21 Mar 2019 07:01  -  22 Mar 2019 07:00  --------------------------------------------------------  IN:    IV PiggyBack: 50 mL    Oral Fluid: 710 mL    sodium chloride 0.9%.: 1502 mL  Total IN: 2262 mL    OUT:    Voided: 1400 mL  Total OUT: 1400 mL    Total NET: 862 mL      22 Mar 2019 07:01  -  23 Mar 2019 00:28  --------------------------------------------------------  IN:    IV PiggyBack: 600 mL    Oral Fluid: 840 mL  Total IN: 1440 mL    OUT:    Voided: 1150 mL  Total OUT: 1150 mL    Total NET: 290 mL          MEDICATIONS  (STANDING):  acetaminophen   Tablet .. 650 milliGRAM(s) Oral every 6 hours  docusate sodium 100 milliGRAM(s) Oral three times a day  enoxaparin Injectable 40 milliGRAM(s) SubCutaneous every 24 hours  gabapentin 100 milliGRAM(s) Oral three times a day  lidocaine   Patch 1 Patch Transdermal daily  lidocaine   Patch 1 Patch Transdermal daily  potassium phosphate / sodium phosphate powder 1 Packet(s) Oral two times a day  sodium chloride 0.9%. 1000 milliLiter(s) (125 mL/Hr) IV Continuous <Continuous>    MEDICATIONS  (PRN):  bisacodyl Suppository 10 milliGRAM(s) Rectal daily PRN If no bowel movement by POD#2  HYDROmorphone  Injectable 1 milliGRAM(s) IV Push every 6 hours PRN breakthrough  magnesium hydroxide Suspension 30 milliLiter(s) Oral daily PRN Constipation  oxyCODONE    IR 10 milliGRAM(s) Oral every 4 hours PRN Severe Pain (7 - 10)  oxyCODONE    IR 5 milliGRAM(s) Oral every 4 hours PRN Moderate Pain (4 - 6)  sodium chloride 0.65% Nasal 1 Spray(s) Both Nostrils every 6 hours PRN Nasal Congestion                            11.2   8.7   )-----------( CLUMPED    ( 22 Mar 2019 07:27 )             33.5       03-22    139  |  102  |  22  ----------------------------<  112<H>  3.7   |  26  |  0.94    Ca    8.2<L>      22 Mar 2019 07:27  Phos  1.8     03-22  Mg     1.9     03-22        Physical Exam:  Gen: Laying in bed, NAD, multiple facial ecchymoses present, R eye swollen  Resp: Unlabored breathing  Abd: soft, NTND, no rebound or guarding  Ext: WWP, ACE wrap present on R arm, L arm/elbow in brace, L shoulder in sling, good distal sensory and motor b/l, L 4th finger in splint, strongly palpable radial pulse b/l

## 2019-03-23 NOTE — PROGRESS NOTE ADULT - ASSESSMENT
Assessment:  s/p Open Reduction Internal Fixation / Surgical Repair B/L DR price w/ elbow dislocation    Plan:   OT: BUE  cont brace  ice / elevate  Follow up Dr Bermudez for elbow injury  Follow up CT scan elbow

## 2019-03-23 NOTE — PROGRESS NOTE ADULT - SUBJECTIVE AND OBJECTIVE BOX
Post op Day [2 ]  Hospital Day [ ]     Pt c/o swelling L hand and digits    No chest pain, SOB, N/V.    T(C): 36.8 (03-23-19 @ 07:57), Max: 37.7 (03-22-19 @ 18:50)  HR: 74 (03-23-19 @ 07:57) (74 - 100)  BP: 132/80 (03-23-19 @ 07:57) (78/43 - 152/84)  RR: 18 (03-23-19 @ 07:57) (8 - 18)  SpO2: 97% (03-23-19 @ 07:57) (92% - 99%)  Wt(kg): --    Exam:    EXT:   RUE: Splint in tact          moving all digits          sensation in tact    LUE:           Brace in place (adjusted last night           Dsg in place: incision c/d           (+) fusiform swelling           Decr motor 2/2 pain & swelling           (+) sensation            2+ pulses                                                             11.2   8.7   )-----------( CLUMPED    ( 22 Mar 2019 07:27 )             33.5    03-23    138  |  102  |  16  ----------------------------<  117<H>  4.0   |  26  |  0.85    Ca    8.6      23 Mar 2019 06:20  Phos  2.1     03-23  Mg     2.0     03-23

## 2019-03-24 LAB
ANION GAP SERPL CALC-SCNC: 12 MMOL/L — SIGNIFICANT CHANGE UP (ref 5–17)
BUN SERPL-MCNC: 16 MG/DL — SIGNIFICANT CHANGE UP (ref 7–23)
CALCIUM SERPL-MCNC: 8.8 MG/DL — SIGNIFICANT CHANGE UP (ref 8.4–10.5)
CHLORIDE SERPL-SCNC: 99 MMOL/L — SIGNIFICANT CHANGE UP (ref 96–108)
CO2 SERPL-SCNC: 26 MMOL/L — SIGNIFICANT CHANGE UP (ref 22–31)
CREAT SERPL-MCNC: 0.75 MG/DL — SIGNIFICANT CHANGE UP (ref 0.5–1.3)
GLUCOSE SERPL-MCNC: 133 MG/DL — HIGH (ref 70–99)
HCT VFR BLD CALC: 32.3 % — LOW (ref 39–50)
HGB BLD-MCNC: 10.8 G/DL — LOW (ref 13–17)
MAGNESIUM SERPL-MCNC: 2 MG/DL — SIGNIFICANT CHANGE UP (ref 1.6–2.6)
MCHC RBC-ENTMCNC: 31.7 PG — SIGNIFICANT CHANGE UP (ref 27–34)
MCHC RBC-ENTMCNC: 33.3 GM/DL — SIGNIFICANT CHANGE UP (ref 32–36)
MCV RBC AUTO: 95.3 FL — SIGNIFICANT CHANGE UP (ref 80–100)
PHOSPHATE SERPL-MCNC: 2.5 MG/DL — SIGNIFICANT CHANGE UP (ref 2.5–4.5)
PLATELET # BLD AUTO: 108 K/UL — LOW (ref 150–400)
POTASSIUM SERPL-MCNC: 3.7 MMOL/L — SIGNIFICANT CHANGE UP (ref 3.5–5.3)
POTASSIUM SERPL-SCNC: 3.7 MMOL/L — SIGNIFICANT CHANGE UP (ref 3.5–5.3)
RBC # BLD: 3.39 M/UL — LOW (ref 4.2–5.8)
RBC # FLD: 12.6 % — SIGNIFICANT CHANGE UP (ref 10.3–14.5)
SODIUM SERPL-SCNC: 137 MMOL/L — SIGNIFICANT CHANGE UP (ref 135–145)
WBC # BLD: 8 K/UL — SIGNIFICANT CHANGE UP (ref 3.8–10.5)
WBC # FLD AUTO: 8 K/UL — SIGNIFICANT CHANGE UP (ref 3.8–10.5)

## 2019-03-24 PROCEDURE — 99232 SBSQ HOSP IP/OBS MODERATE 35: CPT

## 2019-03-24 RX ADMIN — Medication 975 MILLIGRAM(S): at 18:31

## 2019-03-24 RX ADMIN — ENOXAPARIN SODIUM 40 MILLIGRAM(S): 100 INJECTION SUBCUTANEOUS at 18:31

## 2019-03-24 RX ADMIN — Medication 100 MILLIGRAM(S): at 13:59

## 2019-03-24 RX ADMIN — Medication 975 MILLIGRAM(S): at 23:45

## 2019-03-24 RX ADMIN — LIDOCAINE 1 PATCH: 4 CREAM TOPICAL at 14:00

## 2019-03-24 RX ADMIN — Medication 975 MILLIGRAM(S): at 15:01

## 2019-03-24 RX ADMIN — Medication 975 MILLIGRAM(S): at 00:45

## 2019-03-24 RX ADMIN — TRAMADOL HYDROCHLORIDE 50 MILLIGRAM(S): 50 TABLET ORAL at 15:00

## 2019-03-24 RX ADMIN — LIDOCAINE 1 PATCH: 4 CREAM TOPICAL at 23:12

## 2019-03-24 RX ADMIN — GABAPENTIN 300 MILLIGRAM(S): 400 CAPSULE ORAL at 06:29

## 2019-03-24 RX ADMIN — Medication 100 MILLIGRAM(S): at 23:14

## 2019-03-24 RX ADMIN — Medication 1 PACKET(S): at 06:28

## 2019-03-24 RX ADMIN — Medication 100 MILLIGRAM(S): at 06:29

## 2019-03-24 RX ADMIN — TRAMADOL HYDROCHLORIDE 50 MILLIGRAM(S): 50 TABLET ORAL at 00:45

## 2019-03-24 RX ADMIN — LIDOCAINE 1 PATCH: 4 CREAM TOPICAL at 18:03

## 2019-03-24 RX ADMIN — Medication 1 APPLICATION(S): at 00:13

## 2019-03-24 RX ADMIN — LIDOCAINE 1 PATCH: 4 CREAM TOPICAL at 13:59

## 2019-03-24 RX ADMIN — TRAMADOL HYDROCHLORIDE 50 MILLIGRAM(S): 50 TABLET ORAL at 18:31

## 2019-03-24 RX ADMIN — TRAMADOL HYDROCHLORIDE 50 MILLIGRAM(S): 50 TABLET ORAL at 07:00

## 2019-03-24 RX ADMIN — TRAMADOL HYDROCHLORIDE 50 MILLIGRAM(S): 50 TABLET ORAL at 00:14

## 2019-03-24 RX ADMIN — GABAPENTIN 300 MILLIGRAM(S): 400 CAPSULE ORAL at 23:14

## 2019-03-24 RX ADMIN — Medication 975 MILLIGRAM(S): at 14:01

## 2019-03-24 RX ADMIN — TRAMADOL HYDROCHLORIDE 50 MILLIGRAM(S): 50 TABLET ORAL at 23:14

## 2019-03-24 RX ADMIN — TRAMADOL HYDROCHLORIDE 50 MILLIGRAM(S): 50 TABLET ORAL at 13:59

## 2019-03-24 RX ADMIN — GABAPENTIN 300 MILLIGRAM(S): 400 CAPSULE ORAL at 13:59

## 2019-03-24 RX ADMIN — LIDOCAINE 1 PATCH: 4 CREAM TOPICAL at 00:00

## 2019-03-24 RX ADMIN — Medication 975 MILLIGRAM(S): at 23:14

## 2019-03-24 RX ADMIN — Medication 975 MILLIGRAM(S): at 06:29

## 2019-03-24 RX ADMIN — Medication 975 MILLIGRAM(S): at 07:00

## 2019-03-24 RX ADMIN — TRAMADOL HYDROCHLORIDE 50 MILLIGRAM(S): 50 TABLET ORAL at 23:45

## 2019-03-24 RX ADMIN — Medication 975 MILLIGRAM(S): at 00:16

## 2019-03-24 RX ADMIN — TRAMADOL HYDROCHLORIDE 50 MILLIGRAM(S): 50 TABLET ORAL at 06:29

## 2019-03-24 NOTE — PROGRESS NOTE ADULT - SUBJECTIVE AND OBJECTIVE BOX
63M who presents to [facility] with a c/o of b/l wrist pain and L elbow pain. Patient states he was helping his friend on a roof earlier today when he stepped backwards and fell 20 feet to the ground. Admits to headstrike and LOC as he is unable to fully recall the event. Level II trauma code initiated upon arrival to hospital. Denies any numbness or tingling. Admits to previous L arm surgery in 1957 but denies any other orthopedic surgeries. 63M w/ Grade 1 Open R DR Fx, L DR Fx, L Elbow Dislocation, and L 4th Digit Base of Distal Phalanx fracture. Left arm with increasing edema.    PAST MEDICAL & SURGICAL HISTORY:  Diverticulitis of large intestine without perforation or abscess without bleeding  DVT (deep venous thrombosis): LLE 2012, treated with coumadin x 6 months.  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 650 milliGRAM(s) Oral every 6 hours  docusate sodium 100 milliGRAM(s) Oral three times a day  enoxaparin Injectable 40 milliGRAM(s) SubCutaneous every 24 hours  gabapentin 100 milliGRAM(s) Oral three times a day  lidocaine   Patch 1 Patch Transdermal daily  lidocaine   Patch 1 Patch Transdermal daily  potassium phosphate / sodium phosphate powder 1 Packet(s) Oral two times a day  sodium chloride 0.9%. 1000 milliLiter(s) (125 mL/Hr) IV Continuous <Continuous>  sodium phosphate IVPB 15 milliMole(s) IV Intermittent once    MEDICATIONS  (PRN):  bisacodyl Suppository 10 milliGRAM(s) Rectal daily PRN If no bowel movement by POD#2  HYDROmorphone  Injectable 1 milliGRAM(s) IV Push every 6 hours PRN breakthrough  magnesium hydroxide Suspension 30 milliLiter(s) Oral daily PRN Constipation  oxyCODONE    IR 10 milliGRAM(s) Oral every 4 hours PRN Severe Pain (7 - 10)  oxyCODONE    IR 5 milliGRAM(s) Oral every 4 hours PRN Moderate Pain (4 - 6)  sodium chloride 0.65% Nasal 1 Spray(s) Both Nostrils every 6 hours PRN Nasal Congestion    Vital Signs Last 24 Hrs  T(C): 37.3 (23 Mar 2019 13:15), Max: 37.7 (22 Mar 2019 18:50)  T(F): 99.2 (23 Mar 2019 13:15), Max: 99.8 (22 Mar 2019 18:50)  HR: 71 (23 Mar 2019 13:15) (71 - 93)  BP: 129/86 (23 Mar 2019 13:15) (126/75 - 152/84)  BP(mean): --  RR: 18 (23 Mar 2019 13:15) (8 - 18)  SpO2: 97% (23 Mar 2019 13:15) (95% - 98%)      PHYSICAL EXAM:  GENERAL: NAD, well-groomed, well-developed  HEAD:  Atraumatic, Normocephalic  Facial trauma and orbital fractures  EYES: EOMI, PERRLA, conjunctiva and sclera clear  ENMT: No tonsillar erythema, exudates, or enlargement; Moist mucous membranes, Good dentition, No lesions  NECK: Supple, No JVD, Normal thyroid  NERVOUS SYSTEM:  Alert & Oriented X3, Good concentration; Motor Strength 5/5 B/L upper and lower extremities; DTRs 2+ intact and symmetric  CHEST/LUNG: Clear to percussion bilaterally; No rales, rhonchi, wheezing, or rubs  Bilateral rib fractures   HEART: Regular rate and rhythm; No murmurs, rubs, or gallops  ABDOMEN: Soft, Nontender, Nondistended; Bowel sounds present  EXTREMITIES:  2+ Peripheral Pulses, No clubbing, cyanosis, or edema elbow and finger fractrues  LYMPH: No lymphadenopathy noted  SKIN: No rashes or lesions        LABS:                   CBC Full  -  ( 23 Mar 2019 09:43 )  WBC Count : 9.12 K/uL  Hemoglobin : 10.6 g/dL  Hematocrit : 32.5 %  Platelet Count - Automated : 85 K/uL  Mean Cell Volume : 94.5 fl  Mean Cell Hemoglobin : 30.8 pg  Mean Cell Hemoglobin Concentration : 32.6 gm/dL  Auto Neutrophil # : x  Auto Lymphocyte # : x  Auto Monocyte # : x  Auto Eosinophil # : x  Auto Basophil # : x  Auto Neutrophil % : x  Auto Lymphocyte % : x  Auto Monocyte % : x  Auto Eosinophil % : x  Auto Basophil % : x    03-23    138  |  102  |  16  ----------------------------<  117<H>  4.0   |  26  |  0.85    Ca    8.6      23 Mar 2019 06:20  Phos  2.1     03-23  Mg     2.0     03-23                            Radiology reports: 63M who presents to [facility] with a c/o of b/l wrist pain and L elbow pain. Patient states he was helping his friend on a roof earlier today when he stepped backwards and fell 20 feet to the ground. Admits to headstrike and LOC as he is unable to fully recall the event. Level II trauma code initiated upon arrival to hospital. Denies any numbness or tingling. Admits to previous L arm surgery in 1957 but denies any other orthopedic surgeries. 63M w/ Grade 1 Open R DR Fx, L DR Fx, L Elbow Dislocation, and L 4th Digit Base of Distal Phalanx fracture. Left arm with increasing edema.    PAST MEDICAL & SURGICAL HISTORY:  Diverticulitis of large intestine without perforation or abscess without bleeding  DVT (deep venous thrombosis): LLE 2012, treated with coumadin x 6 months.  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 975 milliGRAM(s) Oral every 6 hours  docusate sodium 100 milliGRAM(s) Oral three times a day  enoxaparin Injectable 40 milliGRAM(s) SubCutaneous every 24 hours  gabapentin 300 milliGRAM(s) Oral three times a day  lidocaine   Patch 1 Patch Transdermal daily  lidocaine   Patch 1 Patch Transdermal daily  traMADol 50 milliGRAM(s) Oral every 6 hours    MEDICATIONS  (PRN):  bisacodyl Suppository 10 milliGRAM(s) Rectal daily PRN If no bowel movement by POD#2  HYDROmorphone  Injectable 1 milliGRAM(s) IV Push every 6 hours PRN breakthrough  magnesium hydroxide Suspension 30 milliLiter(s) Oral daily PRN Constipation  oxyCODONE    IR 10 milliGRAM(s) Oral every 4 hours PRN Severe Pain (7 - 10)  oxyCODONE    IR 5 milliGRAM(s) Oral every 4 hours PRN Moderate Pain (4 - 6)  sodium chloride 0.65% Nasal 1 Spray(s) Both Nostrils every 6 hours PRN Nasal Congestion    Vital Signs Last 24 Hrs  T(C): 36.9 (24 Mar 2019 17:20), Max: 37.7 (24 Mar 2019 00:03)  T(F): 98.4 (24 Mar 2019 17:20), Max: 99.8 (24 Mar 2019 00:03)  HR: 69 (24 Mar 2019 17:20) (66 - 83)  BP: 107/72 (24 Mar 2019 17:20) (107/72 - 142/81)  BP(mean): --  RR: 18 (24 Mar 2019 17:20) (18 - 18)  SpO2: 94% (24 Mar 2019 17:20) (94% - 96%)    PHYSICAL EXAM:  GENERAL: NAD, well-groomed, well-developed  HEAD:  Atraumatic, Normocephalic  Facial trauma and orbital fractures  EYES: EOMI, PERRLA, conjunctiva and sclera clear  ENMT: No tonsillar erythema, exudates, or enlargement; Moist mucous membranes, Good dentition, No lesions  NECK: Supple, No JVD, Normal thyroid  NERVOUS SYSTEM:  Alert & Oriented X3, Good concentration; Motor Strength 5/5 B/L upper and lower extremities; DTRs 2+ intact and symmetric  CHEST/LUNG: Clear to percussion bilaterally; No rales, rhonchi, wheezing, or rubs  Bilateral rib fractures   HEART: Regular rate and rhythm; No murmurs, rubs, or gallops  ABDOMEN: Soft, Nontender, Nondistended; Bowel sounds present  EXTREMITIES:  2+ Peripheral Pulses, No clubbing, cyanosis, or edema elbow and finger fractrues  LYMPH: No lymphadenopathy noted  SKIN: No rashes or lesions        LABS:                   CBC Full  -  ( 24 Mar 2019 08:58 )  WBC Count : 8.0 K/uL  Hemoglobin : 10.8 g/dL  Hematocrit : 32.3 %  Platelet Count - Automated : 108 K/uL  Mean Cell Volume : 95.3 fl  Mean Cell Hemoglobin : 31.7 pg  Mean Cell Hemoglobin Concentration : 33.3 gm/dL  Auto Neutrophil # : x  Auto Lymphocyte # : x  Auto Monocyte # : x  Auto Eosinophil # : x  Auto Basophil # : x  Auto Neutrophil % : x  Auto Lymphocyte % : x  Auto Monocyte % : x  Auto Eosinophil % : x  Auto Basophil % : x    03-24    137  |  99  |  16  ----------------------------<  133<H>  3.7   |  26  |  0.75    Ca    8.8      24 Mar 2019 08:58  Phos  2.5     03-24  Mg     2.0     03-24                                    Radiology reports: 63M who presents to [facility] with a c/o of b/l wrist pain and L elbow pain. Patient states he was helping his friend on a roof earlier today when he stepped backwards and fell 20 feet to the ground. Admits to headstrike and LOC as he is unable to fully recall the event. Level II trauma code initiated upon arrival to hospital. Denies any numbness or tingling. Admits to previous L arm surgery in 1957 but denies any other orthopedic surgeries. 63M w/ Grade 1 Open R DR Fx, L DR Fx, L Elbow Dislocation, and L 4th Digit Base of Distal Phalanx fracture. Left arm with stable edema, the past 24 hours     PAST MEDICAL & SURGICAL HISTORY:  Diverticulitis of large intestine without perforation or abscess without bleeding  DVT (deep venous thrombosis): LLE 2012, treated with coumadin x 6 months.  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 975 milliGRAM(s) Oral every 6 hours  docusate sodium 100 milliGRAM(s) Oral three times a day  enoxaparin Injectable 40 milliGRAM(s) SubCutaneous every 24 hours  gabapentin 300 milliGRAM(s) Oral three times a day  lidocaine   Patch 1 Patch Transdermal daily  lidocaine   Patch 1 Patch Transdermal daily  traMADol 50 milliGRAM(s) Oral every 6 hours    MEDICATIONS  (PRN):  bisacodyl Suppository 10 milliGRAM(s) Rectal daily PRN If no bowel movement by POD#2  HYDROmorphone  Injectable 1 milliGRAM(s) IV Push every 6 hours PRN breakthrough  magnesium hydroxide Suspension 30 milliLiter(s) Oral daily PRN Constipation  oxyCODONE    IR 10 milliGRAM(s) Oral every 4 hours PRN Severe Pain (7 - 10)  oxyCODONE    IR 5 milliGRAM(s) Oral every 4 hours PRN Moderate Pain (4 - 6)  sodium chloride 0.65% Nasal 1 Spray(s) Both Nostrils every 6 hours PRN Nasal Congestion    Vital Signs Last 24 Hrs  T(C): 36.9 (24 Mar 2019 17:20), Max: 37.7 (24 Mar 2019 00:03)  T(F): 98.4 (24 Mar 2019 17:20), Max: 99.8 (24 Mar 2019 00:03)  HR: 69 (24 Mar 2019 17:20) (66 - 83)  BP: 107/72 (24 Mar 2019 17:20) (107/72 - 142/81)  BP(mean): --  RR: 18 (24 Mar 2019 17:20) (18 - 18)  SpO2: 94% (24 Mar 2019 17:20) (94% - 96%)    PHYSICAL EXAM:  GENERAL: NAD, well-groomed, well-developed  HEAD:  Atraumatic, Normocephalic  Facial trauma and orbital fractures  EYES: EOMI, PERRLA, conjunctiva and sclera clear  ENMT: No tonsillar erythema, exudates, or enlargement; Moist mucous membranes, Good dentition, No lesions  NECK: Supple, No JVD, Normal thyroid  NERVOUS SYSTEM:  Alert & Oriented X3, Good concentration; Motor Strength 5/5 B/L upper and lower extremities; DTRs 2+ intact and symmetric  CHEST/LUNG: Clear to percussion bilaterally; No rales, rhonchi, wheezing, or rubs  Bilateral rib fractures   HEART: Regular rate and rhythm; No murmurs, rubs, or gallops  ABDOMEN: Soft, Nontender, Nondistended; Bowel sounds present  EXTREMITIES:  2+ Peripheral Pulses, No clubbing, cyanosis, or edema elbow and finger fractrues  LYMPH: No lymphadenopathy noted  SKIN: No rashes or lesions        LABS:                   CBC Full  -  ( 24 Mar 2019 08:58 )  WBC Count : 8.0 K/uL  Hemoglobin : 10.8 g/dL  Hematocrit : 32.3 %  Platelet Count - Automated : 108 K/uL  Mean Cell Volume : 95.3 fl  Mean Cell Hemoglobin : 31.7 pg  Mean Cell Hemoglobin Concentration : 33.3 gm/dL  Auto Neutrophil # : x  Auto Lymphocyte # : x  Auto Monocyte # : x  Auto Eosinophil # : x  Auto Basophil # : x  Auto Neutrophil % : x  Auto Lymphocyte % : x  Auto Monocyte % : x  Auto Eosinophil % : x  Auto Basophil % : x    03-24    137  |  99  |  16  ----------------------------<  133<H>  3.7   |  26  |  0.75    Ca    8.8      24 Mar 2019 08:58  Phos  2.5     03-24  Mg     2.0     03-24                                    Radiology reports:

## 2019-03-24 NOTE — PROGRESS NOTE ADULT - SUBJECTIVE AND OBJECTIVE BOX
ORTHO POC NOTE      Patient resting without complaints.  Pain well controlled. No events overnight.  No chest pain, SOB, N/V.      T(C): 36.8 (03-24-19 @ 06:38), Max: 37.7 (03-24-19 @ 00:03)  HR: 83 (03-24-19 @ 06:38) (71 - 83)  BP: 142/81 (03-24-19 @ 06:38) (107/74 - 142/81)  RR: 18 (03-24-19 @ 06:38) (18 - 18)  SpO2: 95% (03-24-19 @ 06:38) (95% - 97%)      Exam:  Alert and Oriented, No Acute Distress  Ext: RUE: Splint in place, hand/fingers with 2+ edema,   digits warm with dull sensation grossly intact, brisk cap refill, motor intact  LUE: Hinged elbow brace in place, L 4th digit in splint, hand/fingers with 1+edema   digits warm with dull sensation grossly intact, brisk cap refill, motor intact  Lower Extremities:  Calves Soft, Non-tender bilaterally                            10.6   9.12  )-----------( 85       ( 23 Mar 2019 09:43 )             32.5    03-23    138  |  102  |  16  ----------------------------<  117<H>  4.0   |  26  |  0.85    Ca    8.6      23 Mar 2019 06:20  Phos  2.1     03-23  Mg     2.0     03-23

## 2019-03-24 NOTE — PROGRESS NOTE ADULT - ASSESSMENT
63 year old male s/p fall from 20 feet, hemodynamically stable, with multiple facial fracture, bilateral distal radius fractures, and bilateral rib fractures (R 5-9 and L 4-6).    - multimodal analgesia with tylenol, tramadol, oxycodone, dilaudid and lidoderm  - PT/OT-->home with home PT and OT are the anticipated recs.       - Patient not yet cleared by PT/OT team      - As per PT/OT team, patient will be stable for discharge on Monday.   - appreciate Ophtho recs -right orbital floor fracture, right ZMC fracture. Clinically, globe intact, no evidence of extraocular muscle entrapment.  No nose blowing, sneezing, heavy lifting, straining- ice packs to right eye, Nasal Decongestants BID, PO Antibiotics x 7-10 days. follow-Up:  Patient should follow up his/her ophthalmologist or in the [facility] within 1 week of discharge.  - appreciate Ortho recs/plan: CT left elbow then discharge home when stable.    - f/u plastics, - bacitracin to lip suture lines BID-no plastic surgery intervention for fractures at this time,-follow up with Dr. Fernando on Tuesday 3/26/19, you may call [phone] to schedule an appointment.   - Dispo: likely d/c Monday 3/25    [id number]

## 2019-03-24 NOTE — PROGRESS NOTE ADULT - SUBJECTIVE AND OBJECTIVE BOX
Surgery Progress Note    S: Patient seen and examined. No acute events overnight. Patient was fitted with a new L elbow brace.   - PT anticipated discharge rec is "home with home PT," but patient not yet cleared by PT. Will likely be cleared on Monday  - OT recommending home with home OT. Will likely be cleared by both PT/OT for discharge on Monday.  - Pt having pain when working with PT and OT. Orthopedic Sx team contacted for pain control recs. Tylenol increased to 975mg q6. Added tramadol 50 mg q6 standing. c/w dilaudid 1mg q6 for breakthrough, oxy 5mg for moderate pain, and oxy 10mg for severe pain.    - Tolerating diet. Ensure Enlive added to diet.     O:  Vital Signs Last 24 Hrs  T(C): 37.7 (24 Mar 2019 00:03), Max: 37.7 (24 Mar 2019 00:03)  T(F): 99.8 (24 Mar 2019 00:03), Max: 99.8 (24 Mar 2019 00:03)  HR: 77 (24 Mar 2019 00:03) (71 - 77)  BP: 134/85 (24 Mar 2019 00:03) (107/74 - 136/81)  BP(mean): --  RR: 18 (24 Mar 2019 00:03) (18 - 18)  SpO2: 96% (23 Mar 2019 21:12) (95% - 97%)    03-22-19 @ 07:01  -  03-23-19 @ 07:00  --------------------------------------------------------  IN: 1440 mL / OUT: 1350 mL / NET: 90 mL    03-23-19 @ 07:01  -  03-24-19 @ 02:35  --------------------------------------------------------  IN: 1180 mL / OUT: 1600 mL / NET: -420 mL      MEDICATIONS  (STANDING):  acetaminophen   Tablet .. 975 milliGRAM(s) Oral every 6 hours  docusate sodium 100 milliGRAM(s) Oral three times a day  enoxaparin Injectable 40 milliGRAM(s) SubCutaneous every 24 hours  gabapentin 300 milliGRAM(s) Oral three times a day  lidocaine   Patch 1 Patch Transdermal daily  lidocaine   Patch 1 Patch Transdermal daily  potassium phosphate / sodium phosphate powder 1 Packet(s) Oral two times a day  traMADol 50 milliGRAM(s) Oral every 6 hours    MEDICATIONS  (PRN):  bisacodyl Suppository 10 milliGRAM(s) Rectal daily PRN If no bowel movement by POD#2  HYDROmorphone  Injectable 1 milliGRAM(s) IV Push every 6 hours PRN breakthrough  magnesium hydroxide Suspension 30 milliLiter(s) Oral daily PRN Constipation  oxyCODONE    IR 10 milliGRAM(s) Oral every 4 hours PRN Severe Pain (7 - 10)  oxyCODONE    IR 5 milliGRAM(s) Oral every 4 hours PRN Moderate Pain (4 - 6)  sodium chloride 0.65% Nasal 1 Spray(s) Both Nostrils every 6 hours PRN Nasal Congestion      LABS:                        10.6   9.12  )-----------( 85       ( 23 Mar 2019 09:43 )             32.5     03-23    138  |  102  |  16  ----------------------------<  117<H>  4.0   |  26  |  0.85    Ca    8.6      23 Mar 2019 06:20  Phos  2.1     03-23  Mg     2.0     03-23          Physical Exam:  Gen: Laying in bed, NAD, multiple facial ecchymoses present, R eye swollen  Resp: Unlabored breathing  Abd: soft, NTND, no rebound or guarding  Ext: WWP, ACE wrap present on R arm, L arm/elbow in brace, L shoulder in sling, good distal sensory and motor b/l, L 4th finger in splint, strongly palpable radial pulse b/l

## 2019-03-24 NOTE — PROGRESS NOTE ADULT - ASSESSMENT
62 yo male s/p fall from a ladder presents  w/ Grade 1 Open R DR Fx, L DR Fx, L Elbow Dislocation, and L 4th Digit Base of Distal Phalanx fracture. Patient seen post op resting comfortably 62 yo male s/p fall from a ladder presents  w/ Grade 1 Open R DR Fx, L DR Fx, L Elbow Dislocation, and L 4th Digit Base of Distal Phalanx fracture. Patient seen post op resting comfortably. Out of bed to chair an

## 2019-03-24 NOTE — PROGRESS NOTE ADULT - ATTENDING COMMENTS
Fell off a ladder 20 feet with facial, left > than right arm injuries and multiple rib fractures. Remain alert and responsive and is taking limited narcotics, to prevent excessive sedation.  Grade 1 Open R DR Fx, L DR Fx, L Elbow Dislocation, and L 4th Digit Base of Distal Phalanx fracture. Left arm with increasing edema. No medical complications post-op to date.  Continues pulmonary toilet to lessen atelectasis, leg exercises as taught to lessen the risk of DVT and supervised pain medications for post-op pain control.

## 2019-03-24 NOTE — PROGRESS NOTE ADULT - ASSESSMENT
A/P: 64 y/o M POD#3 S/P I&D of hands/wrists/ORIF of fracture of bilateral distal radius and ulna bones       DVT ppx- Lovenox  B/L UE NWB  L 4th digit splint  Pain management prn  Regular diet  Discharge planning to Rehab        SAMIR Barragan  Orthopedic Surgery  [phone]

## 2019-03-25 LAB
ANION GAP SERPL CALC-SCNC: 11 MMOL/L — SIGNIFICANT CHANGE UP (ref 5–17)
BUN SERPL-MCNC: 16 MG/DL — SIGNIFICANT CHANGE UP (ref 7–23)
CALCIUM SERPL-MCNC: 9 MG/DL — SIGNIFICANT CHANGE UP (ref 8.4–10.5)
CHLORIDE SERPL-SCNC: 98 MMOL/L — SIGNIFICANT CHANGE UP (ref 96–108)
CO2 SERPL-SCNC: 27 MMOL/L — SIGNIFICANT CHANGE UP (ref 22–31)
CREAT SERPL-MCNC: 0.78 MG/DL — SIGNIFICANT CHANGE UP (ref 0.5–1.3)
GLUCOSE SERPL-MCNC: 96 MG/DL — SIGNIFICANT CHANGE UP (ref 70–99)
HCT VFR BLD CALC: 32.5 % — LOW (ref 39–50)
HGB BLD-MCNC: 10.4 G/DL — LOW (ref 13–17)
MAGNESIUM SERPL-MCNC: 2 MG/DL — SIGNIFICANT CHANGE UP (ref 1.6–2.6)
MCHC RBC-ENTMCNC: 30.1 PG — SIGNIFICANT CHANGE UP (ref 27–34)
MCHC RBC-ENTMCNC: 32 GM/DL — SIGNIFICANT CHANGE UP (ref 32–36)
MCV RBC AUTO: 94.2 FL — SIGNIFICANT CHANGE UP (ref 80–100)
PHOSPHATE SERPL-MCNC: 3.5 MG/DL — SIGNIFICANT CHANGE UP (ref 2.5–4.5)
PLATELET # BLD AUTO: 127 K/UL — LOW (ref 150–400)
POTASSIUM SERPL-MCNC: 3.7 MMOL/L — SIGNIFICANT CHANGE UP (ref 3.5–5.3)
POTASSIUM SERPL-SCNC: 3.7 MMOL/L — SIGNIFICANT CHANGE UP (ref 3.5–5.3)
RBC # BLD: 3.45 M/UL — LOW (ref 4.2–5.8)
RBC # FLD: 14.1 % — SIGNIFICANT CHANGE UP (ref 10.3–14.5)
SODIUM SERPL-SCNC: 136 MMOL/L — SIGNIFICANT CHANGE UP (ref 135–145)
WBC # BLD: 7.18 K/UL — SIGNIFICANT CHANGE UP (ref 3.8–10.5)
WBC # FLD AUTO: 7.18 K/UL — SIGNIFICANT CHANGE UP (ref 3.8–10.5)

## 2019-03-25 PROCEDURE — 99233 SBSQ HOSP IP/OBS HIGH 50: CPT

## 2019-03-25 PROCEDURE — 73200 CT UPPER EXTREMITY W/O DYE: CPT | Mod: 26,LT

## 2019-03-25 PROCEDURE — 76377 3D RENDER W/INTRP POSTPROCES: CPT | Mod: 26

## 2019-03-25 RX ORDER — POTASSIUM CHLORIDE 20 MEQ
20 PACKET (EA) ORAL ONCE
Qty: 0 | Refills: 0 | Status: COMPLETED | OUTPATIENT
Start: 2019-03-25 | End: 2019-03-25

## 2019-03-25 RX ADMIN — LIDOCAINE 1 PATCH: 4 CREAM TOPICAL at 12:18

## 2019-03-25 RX ADMIN — Medication 975 MILLIGRAM(S): at 23:30

## 2019-03-25 RX ADMIN — LIDOCAINE 1 PATCH: 4 CREAM TOPICAL at 21:53

## 2019-03-25 RX ADMIN — TRAMADOL HYDROCHLORIDE 50 MILLIGRAM(S): 50 TABLET ORAL at 12:20

## 2019-03-25 RX ADMIN — Medication 975 MILLIGRAM(S): at 12:50

## 2019-03-25 RX ADMIN — TRAMADOL HYDROCHLORIDE 50 MILLIGRAM(S): 50 TABLET ORAL at 06:59

## 2019-03-25 RX ADMIN — Medication 20 MILLIEQUIVALENT(S): at 09:40

## 2019-03-25 RX ADMIN — Medication 100 MILLIGRAM(S): at 13:48

## 2019-03-25 RX ADMIN — Medication 975 MILLIGRAM(S): at 17:19

## 2019-03-25 RX ADMIN — Medication 975 MILLIGRAM(S): at 12:18

## 2019-03-25 RX ADMIN — GABAPENTIN 300 MILLIGRAM(S): 400 CAPSULE ORAL at 06:59

## 2019-03-25 RX ADMIN — OXYCODONE HYDROCHLORIDE 5 MILLIGRAM(S): 5 TABLET ORAL at 22:04

## 2019-03-25 RX ADMIN — GABAPENTIN 300 MILLIGRAM(S): 400 CAPSULE ORAL at 22:03

## 2019-03-25 RX ADMIN — Medication 975 MILLIGRAM(S): at 07:30

## 2019-03-25 RX ADMIN — TRAMADOL HYDROCHLORIDE 50 MILLIGRAM(S): 50 TABLET ORAL at 12:50

## 2019-03-25 RX ADMIN — OXYCODONE HYDROCHLORIDE 5 MILLIGRAM(S): 5 TABLET ORAL at 23:22

## 2019-03-25 RX ADMIN — TRAMADOL HYDROCHLORIDE 50 MILLIGRAM(S): 50 TABLET ORAL at 23:30

## 2019-03-25 RX ADMIN — Medication 100 MILLIGRAM(S): at 22:03

## 2019-03-25 RX ADMIN — Medication 100 MILLIGRAM(S): at 06:59

## 2019-03-25 RX ADMIN — TRAMADOL HYDROCHLORIDE 50 MILLIGRAM(S): 50 TABLET ORAL at 17:19

## 2019-03-25 RX ADMIN — GABAPENTIN 300 MILLIGRAM(S): 400 CAPSULE ORAL at 13:48

## 2019-03-25 RX ADMIN — ENOXAPARIN SODIUM 40 MILLIGRAM(S): 100 INJECTION SUBCUTANEOUS at 17:19

## 2019-03-25 RX ADMIN — TRAMADOL HYDROCHLORIDE 50 MILLIGRAM(S): 50 TABLET ORAL at 07:30

## 2019-03-25 RX ADMIN — Medication 975 MILLIGRAM(S): at 06:59

## 2019-03-25 NOTE — CHART NOTE - NSCHARTNOTEFT_GEN_A_CORE
Orthotegardenia Zapien PA-C spoke with Trauma team about patient follow up instructions  with Dr. Bermudez this week even if in rehab.  Patient to follow up with Dr. Bermudez at   [phone]

## 2019-03-25 NOTE — PROGRESS NOTE ADULT - SUBJECTIVE AND OBJECTIVE BOX
Surgery Progress Note    S: Patient seen and examined. No acute events overnight. Patient awaiting possible rehab placement/home health aide. Pain is well controlled and patient is more comfortable. Tolerating diet without n/v.     O:  Vital Signs Last 24 Hrs  T(C): 37 (25 Mar 2019 06:20), Max: 37 (24 Mar 2019 12:44)  T(F): 98.6 (25 Mar 2019 06:20), Max: 98.6 (24 Mar 2019 12:44)  HR: 69 (25 Mar 2019 06:20) (66 - 77)  BP: 121/83 (25 Mar 2019 06:20) (106/67 - 136/84)  BP(mean): --  RR: 18 (25 Mar 2019 06:20) (18 - 18)  SpO2: 95% (25 Mar 2019 06:20) (93% - 96%)    I&O's Detail    24 Mar 2019 07:01  -  25 Mar 2019 07:00  --------------------------------------------------------  IN:    Oral Fluid: 1750 mL  Total IN: 1750 mL    OUT:    Voided: 850 mL  Total OUT: 850 mL    Total NET: 900 mL          MEDICATIONS  (STANDING):  acetaminophen   Tablet .. 975 milliGRAM(s) Oral every 6 hours  docusate sodium 100 milliGRAM(s) Oral three times a day  enoxaparin Injectable 40 milliGRAM(s) SubCutaneous every 24 hours  gabapentin 300 milliGRAM(s) Oral three times a day  lidocaine   Patch 1 Patch Transdermal daily  lidocaine   Patch 1 Patch Transdermal daily  traMADol 50 milliGRAM(s) Oral every 6 hours    MEDICATIONS  (PRN):  bisacodyl Suppository 10 milliGRAM(s) Rectal daily PRN If no bowel movement by POD#2  HYDROmorphone  Injectable 1 milliGRAM(s) IV Push every 6 hours PRN breakthrough  magnesium hydroxide Suspension 30 milliLiter(s) Oral daily PRN Constipation  oxyCODONE    IR 10 milliGRAM(s) Oral every 4 hours PRN Severe Pain (7 - 10)  oxyCODONE    IR 5 milliGRAM(s) Oral every 4 hours PRN Moderate Pain (4 - 6)  sodium chloride 0.65% Nasal 1 Spray(s) Both Nostrils every 6 hours PRN Nasal Congestion                            10.4   7.18  )-----------( 127      ( 25 Mar 2019 08:57 )             32.5       03-25    136  |  98  |  16  ----------------------------<  96  3.7   |  27  |  0.78    Ca    9.0      25 Mar 2019 07:01  Phos  3.5     03-25  Mg     2.0     03-25        Physical Exam:  Gen: Laying in bed, NAD, multiple facial ecchymoses present, R eye swollen  Resp: Unlabored breathing  Abd: soft, NTND, no rebound or guarding  Ext: WWP, ACE wrap present on R arm, L arm/elbow in brace, L shoulder in sling, good distal sensory and motor b/l, L 4th finger in splint, strongly palpable radial pulse b/l

## 2019-03-25 NOTE — PROGRESS NOTE ADULT - ASSESSMENT
A/p: 63yMale s/p L 4th Base Distal Phlanx Fx, L Elbow Dislocation/Coronoid Fx & Bilateral Distal Radial Fx with ORIF.    PT/OT-NWB BUE  IS  DVT PPx: Lovenox  Pain Control  Plan for Discharge to Rehab    Petey Soto PA-C  Team Pager: #[phone] A/p: 63yMale s/p L 4th Base Distal Phlanx Fx, L Elbow Dislocation/Coronoid Fx & Bilateral Distal Radial Fx with ORIF.  Irrigation and debridement, hand or wrist 21-Mar-2019 right  Open reduction and internal fixation (ORIF) of fracture of bilateral distal radius and ulna bones 21-Mar-2019       PT/OT-NWB BUE  IS  DVT PPx: Lovenox  Pain Control  Plan for Discharge to Rehab    Petey Soto PA-C  Team Pager: #[phone]

## 2019-03-25 NOTE — PROGRESS NOTE ADULT - ASSESSMENT
63 year old male s/p fall from 20 feet, hemodynamically stable, with multiple facial fracture, bilateral distal radius fractures, and bilateral rib fractures (R 5-9 and L 4-6).    - multimodal analgesia with tylenol, tramadol, oxycodone, dilaudid and lidoderm  - PT/OT-->home with home PT and OT  - will f/u for final recs  - appreciate Ophtho recs - f/u as outpatient  - appreciate Ortho recs/plan: c/w ACE wraps and brace and f/u as outpatient  - f/u plastics, - bacitracin to lip suture lines BID-no plastic surgery intervention for fractures at this time  - lovenox for DVT ppx  - OOB and ambulating as tolerated    ACS Surgery  [phone]

## 2019-03-25 NOTE — PROGRESS NOTE ADULT - ATTENDING COMMENTS
seen and examined 03-25-19 @ 0930    pain controlled with tramadol, Tylenol and gabapentin    right 3-9 lateral minimally displaced rib fractures  left 4-9 anterolateral minimally displaced rib fractures  -pain control    right lip lacerations s/p repair by plastic surgery on 3/21  right ZMC fracture  bilateral nasal bone fractures  -outpatient f/u with plastic surgery    left elbow dislocation with ulna coronoid fracture s/p closed reduction on 3/21  -NWB in [place] brace  -outpatient f/u with Dr Donn Cavanaugh    s/p ORIF of left distal radius fracture on 3/21  -NWB in splint    left distal phalanx fracture / dislocation s/p closed reduction on 3/21  -outpatient f/u with Dr Reuben Bermudez    s/p washout / ORIF of grade 1 open right distal radius fracture on 3/21  -NWB in splint, but ok to use for feeding    discharge planning seen and examined 03-25-19 @ 0930    pain controlled with tramadol, Tylenol and gabapentin    right 3-9 lateral minimally displaced rib fractures  left 4-9 anterolateral minimally displaced rib fractures  -pain control    right lip lacerations s/p repair by plastic surgery on 3/21  right ZMC fracture  bilateral nasal bone fractures  -outpatient f/u with plastic surgery    left elbow dislocation with ulna coronoid fracture s/p closed reduction on 3/21  -NWB in [place] brace  -outpatient f/u with Dr Donn Cavanaugh    s/p ORIF of left distal radius fracture on 3/21  -NWB in splint    left ring finger distal phalanx fracture / dislocation s/p closed reduction on 3/21  -outpatient f/u with Dr Reuben Bermudez    s/p washout / ORIF of grade 1 open right distal radius fracture on 3/21  -NWB in splint, but ok to use for feeding    discharge planning

## 2019-03-25 NOTE — PROGRESS NOTE ADULT - SUBJECTIVE AND OBJECTIVE BOX
63M who presents to [facility] with a c/o of b/l wrist pain and L elbow pain. Patient states he was helping his friend on a roof earlier today when he stepped backwards and fell 20 feet to the ground. Admits to headstrike and LOC as he is unable to fully recall the event. Level II trauma code initiated upon arrival to hospital. Denies any numbness or tingling. Admits to previous L arm surgery in 1957 but denies any other orthopedic surgeries. 63M w/ Grade 1 Open R DR Fx, L DR Fx, L Elbow Dislocation, and L 4th Digit Base of Distal Phalanx fracture. Left arm with stable edema, the past 24 hours     MEDICATIONS  (STANDING):  acetaminophen   Tablet .. 975 milliGRAM(s) Oral every 6 hours  docusate sodium 100 milliGRAM(s) Oral three times a day  enoxaparin Injectable 40 milliGRAM(s) SubCutaneous every 24 hours  gabapentin 300 milliGRAM(s) Oral three times a day  lidocaine   Patch 1 Patch Transdermal daily  lidocaine   Patch 1 Patch Transdermal daily  traMADol 50 milliGRAM(s) Oral every 6 hours    MEDICATIONS  (PRN):  bisacodyl Suppository 10 milliGRAM(s) Rectal daily PRN If no bowel movement by POD#2  HYDROmorphone  Injectable 1 milliGRAM(s) IV Push every 6 hours PRN breakthrough  magnesium hydroxide Suspension 30 milliLiter(s) Oral daily PRN Constipation  oxyCODONE    IR 10 milliGRAM(s) Oral every 4 hours PRN Severe Pain (7 - 10)  oxyCODONE    IR 5 milliGRAM(s) Oral every 4 hours PRN Moderate Pain (4 - 6)  sodium chloride 0.65% Nasal 1 Spray(s) Both Nostrils every 6 hours PRN Nasal Congestion          VITALS:   T(C): 36.8 (03-25-19 @ 17:43), Max: 37.3 (03-25-19 @ 10:30)  HR: 70 (03-25-19 @ 17:43) (69 - 77)  BP: 109/71 (03-25-19 @ 17:43) (106/67 - 136/84)  RR: 18 (03-25-19 @ 17:43) (18 - 18)  SpO2: 94% (03-25-19 @ 17:43) (93% - 95%)  Wt(kg): --    PHYSICAL EXAM:  GENERAL: NAD, well-groomed, well-developed  HEAD:  Atraumatic, Normocephalic  Facial trauma and orbital fractures  EYES: EOMI, PERRLA, conjunctiva and sclera clear  ENMT: No tonsillar erythema, exudates, or enlargement; Moist mucous membranes, Good dentition, No lesions  NECK: Supple, No JVD, Normal thyroid  NERVOUS SYSTEM:  Alert & Oriented X3, Good concentration; Motor Strength 5/5 B/L upper and lower extremities; DTRs 2+ intact and symmetric  CHEST/LUNG: Clear to percussion bilaterally; No rales, rhonchi, wheezing, or rubs  Bilateral rib fractures   HEART: Regular rate and rhythm; No murmurs, rubs, or gallops  ABDOMEN: Soft, Nontender, Nondistended; Bowel sounds present  EXTREMITIES:  2+ Peripheral Pulses, No clubbing, cyanosis, or edema elbow and finger fractrues  LYMPH: No lymphadenopathy noted  SKIN: No rashes or lesions    LABS:        CBC Full  -  ( 25 Mar 2019 08:57 )  WBC Count : 7.18 K/uL  Hemoglobin : 10.4 g/dL  Hematocrit : 32.5 %  Platelet Count - Automated : 127 K/uL  Mean Cell Volume : 94.2 fl  Mean Cell Hemoglobin : 30.1 pg  Mean Cell Hemoglobin Concentration : 32.0 gm/dL  Auto Neutrophil # : x  Auto Lymphocyte # : x  Auto Monocyte # : x  Auto Eosinophil # : x  Auto Basophil # : x  Auto Neutrophil % : x  Auto Lymphocyte % : x  Auto Monocyte % : x  Auto Eosinophil % : x  Auto Basophil % : x    03-25    136  |  98  |  16  ----------------------------<  96  3.7   |  27  |  0.78    Ca    9.0      25 Mar 2019 07:01  Phos  3.5     03-25  Mg     2.0     03-25            CAPILLARY BLOOD GLUCOSE          RADIOLOGY & ADDITIONAL TESTS:

## 2019-03-25 NOTE — PROGRESS NOTE ADULT - ASSESSMENT
64 yo male s/p fall from a ladder presents  w/ Grade 1 Open R DR Fx, L DR Fx, L Elbow Dislocation, and L 4th Digit Base of Distal Phalanx fracture. Patient seen post op resting comfortably. Out of bed to chair

## 2019-03-25 NOTE — PROGRESS NOTE ADULT - ATTENDING COMMENTS
Fell off a ladder 20 feet with facial, left > than right arm injuries and multiple rib fractures. Remain alert and responsive and is taking limited narcotics, to prevent excessive sedation.  Grade 1 Open R DR Fx, L DR Fx, L Elbow Dislocation, and L 4th Digit Base of Distal Phalanx fracture. Left arm with increasing edema. No medical complications post-op to date.  Continues pulmonary toilet to lessen atelectasis, leg exercises as taught to lessen the risk of DVT and supervised pain medications for post-op pain control. Fell off a ladder 20 feet with facial, left > than right arm injuries and multiple rib fractures. Remain alert and responsive and is taking limited narcotics, to prevent excessive sedation.  Grade 1 Open R DR Dawson, L DR Dawson, L Elbow Dislocation, and L 4th Digit Base of Distal Phalanx fracture. Left arm with increasing edema. No medical complications post-op to date.  Continues pulmonary toilet to lessen atelectasis, leg exercises as taught to lessen the risk of DVT and supervised pain medications for post-op pain control. I am a non participating [facility] physician seeing Pt in coverage for Dr Zarate Fell off a ladder 20 feet with facial, left > than right arm injuries and multiple rib fractures. Remain alert and responsive and is taking limited narcotics, to prevent excessive sedation.  Grade 1 Open R DR Fx, L DR Fx, L Elbow Dislocation, and L 4th Digit Base of Distal Phalanx fracture. Left arm with increasing edema. No medical complications post-op to date.  Continues pulmonary toilet to lessen atelectasis, leg exercises as taught to lessen the risk of DVT and supervised pain medications for post-op pain control. I am a non participating [facility] physician seeing Pt in coverage for Dr Zarate. The above patient examination was reviewed with Dr. Wu and I agree with his evaluation, assessment and treatment plan.  Mark Zarate M.D.

## 2019-03-25 NOTE — PROGRESS NOTE ADULT - SUBJECTIVE AND OBJECTIVE BOX
Post op Day [4 ]    Patient resting in chair without complaints.  No chest pain, SOB, N/V.    T(C): 37 (03-25-19 @ 06:20), Max: 37 (03-24-19 @ 12:44)  HR: 69 (03-25-19 @ 06:20) (66 - 83)  BP: 121/83 (03-25-19 @ 06:20) (106/67 - 142/81)  RR: 18 (03-25-19 @ 06:20) (18 - 18)  SpO2: 95% (03-25-19 @ 06:20) (93% - 96%)  Wt(kg): 113.4    Exam:  Alert and Oriented, No Acute Distress  Upper Extremities  L Elbow wrapped in ace bandage and with hinge brace applied.  Neuro/Vascularly intact  BUE Mild to moderate edema noted.  R UE wrapped in ace bandage and Neuro/Vascularity intact.  Bilateral radial pulse assessed 2+   Sensation in BUE intact throughout                          10.8   8.0   )-----------( 108      ( 24 Mar 2019 08:58 )             32.3    03-24    137  |  99  |  16  ----------------------------<  133<H>  3.7   |  26  |  0.75    Ca    8.8      24 Mar 2019 08:58  Phos  2.5     03-24  Mg     2.0     03-24

## 2019-03-26 ENCOUNTER — TRANSCRIPTION ENCOUNTER (OUTPATIENT)
Age: 64
End: 2019-03-26

## 2019-03-26 VITALS
SYSTOLIC BLOOD PRESSURE: 113 MMHG | TEMPERATURE: 98 F | DIASTOLIC BLOOD PRESSURE: 80 MMHG | HEART RATE: 66 BPM | OXYGEN SATURATION: 94 % | RESPIRATION RATE: 18 BRPM

## 2019-03-26 DIAGNOSIS — S52.572A OTHER INTRAARTICULAR FRACTURE OF LOWER END OF LEFT RADIUS, INITIAL ENCOUNTER FOR CLOSED FRACTURE: ICD-10-CM

## 2019-03-26 LAB
ANION GAP SERPL CALC-SCNC: 13 MMOL/L — SIGNIFICANT CHANGE UP (ref 5–17)
BUN SERPL-MCNC: 18 MG/DL — SIGNIFICANT CHANGE UP (ref 7–23)
CALCIUM SERPL-MCNC: 9.2 MG/DL — SIGNIFICANT CHANGE UP (ref 8.4–10.5)
CHLORIDE SERPL-SCNC: 97 MMOL/L — SIGNIFICANT CHANGE UP (ref 96–108)
CO2 SERPL-SCNC: 25 MMOL/L — SIGNIFICANT CHANGE UP (ref 22–31)
CREAT SERPL-MCNC: 0.79 MG/DL — SIGNIFICANT CHANGE UP (ref 0.5–1.3)
GLUCOSE SERPL-MCNC: 97 MG/DL — SIGNIFICANT CHANGE UP (ref 70–99)
HCT VFR BLD CALC: 30.8 % — LOW (ref 39–50)
HGB BLD-MCNC: 10.1 G/DL — LOW (ref 13–17)
MAGNESIUM SERPL-MCNC: 1.8 MG/DL — SIGNIFICANT CHANGE UP (ref 1.6–2.6)
MCHC RBC-ENTMCNC: 30.8 PG — SIGNIFICANT CHANGE UP (ref 27–34)
MCHC RBC-ENTMCNC: 32.8 GM/DL — SIGNIFICANT CHANGE UP (ref 32–36)
MCV RBC AUTO: 93.9 FL — SIGNIFICANT CHANGE UP (ref 80–100)
PHOSPHATE SERPL-MCNC: 3.8 MG/DL — SIGNIFICANT CHANGE UP (ref 2.5–4.5)
PLATELET # BLD AUTO: 148 K/UL — LOW (ref 150–400)
POTASSIUM SERPL-MCNC: 3.9 MMOL/L — SIGNIFICANT CHANGE UP (ref 3.5–5.3)
POTASSIUM SERPL-SCNC: 3.9 MMOL/L — SIGNIFICANT CHANGE UP (ref 3.5–5.3)
RBC # BLD: 3.28 M/UL — LOW (ref 4.2–5.8)
RBC # FLD: 14.4 % — SIGNIFICANT CHANGE UP (ref 10.3–14.5)
SODIUM SERPL-SCNC: 135 MMOL/L — SIGNIFICANT CHANGE UP (ref 135–145)
WBC # BLD: 7.66 K/UL — SIGNIFICANT CHANGE UP (ref 3.8–10.5)
WBC # FLD AUTO: 7.66 K/UL — SIGNIFICANT CHANGE UP (ref 3.8–10.5)

## 2019-03-26 PROCEDURE — 84100 ASSAY OF PHOSPHORUS: CPT

## 2019-03-26 PROCEDURE — 71045 X-RAY EXAM CHEST 1 VIEW: CPT

## 2019-03-26 PROCEDURE — 97116 GAIT TRAINING THERAPY: CPT

## 2019-03-26 PROCEDURE — 73060 X-RAY EXAM OF HUMERUS: CPT

## 2019-03-26 PROCEDURE — 97110 THERAPEUTIC EXERCISES: CPT

## 2019-03-26 PROCEDURE — 86901 BLOOD TYPING SEROLOGIC RH(D): CPT

## 2019-03-26 PROCEDURE — 83605 ASSAY OF LACTIC ACID: CPT

## 2019-03-26 PROCEDURE — 96376 TX/PRO/DX INJ SAME DRUG ADON: CPT | Mod: XU

## 2019-03-26 PROCEDURE — 73030 X-RAY EXAM OF SHOULDER: CPT

## 2019-03-26 PROCEDURE — 70450 CT HEAD/BRAIN W/O DYE: CPT

## 2019-03-26 PROCEDURE — 97166 OT EVAL MOD COMPLEX 45 MIN: CPT

## 2019-03-26 PROCEDURE — 82435 ASSAY OF BLOOD CHLORIDE: CPT

## 2019-03-26 PROCEDURE — 82803 BLOOD GASES ANY COMBINATION: CPT

## 2019-03-26 PROCEDURE — 85014 HEMATOCRIT: CPT

## 2019-03-26 PROCEDURE — 73090 X-RAY EXAM OF FOREARM: CPT

## 2019-03-26 PROCEDURE — 73120 X-RAY EXAM OF HAND: CPT

## 2019-03-26 PROCEDURE — 80048 BASIC METABOLIC PNL TOTAL CA: CPT

## 2019-03-26 PROCEDURE — 80053 COMPREHEN METABOLIC PANEL: CPT

## 2019-03-26 PROCEDURE — 97530 THERAPEUTIC ACTIVITIES: CPT

## 2019-03-26 PROCEDURE — 76377 3D RENDER W/INTRP POSTPROCES: CPT

## 2019-03-26 PROCEDURE — 73200 CT UPPER EXTREMITY W/O DYE: CPT

## 2019-03-26 PROCEDURE — 72125 CT NECK SPINE W/O DYE: CPT

## 2019-03-26 PROCEDURE — 97535 SELF CARE MNGMENT TRAINING: CPT

## 2019-03-26 PROCEDURE — 99232 SBSQ HOSP IP/OBS MODERATE 35: CPT

## 2019-03-26 PROCEDURE — 76000 FLUOROSCOPY <1 HR PHYS/QHP: CPT

## 2019-03-26 PROCEDURE — 74177 CT ABD & PELVIS W/CONTRAST: CPT

## 2019-03-26 PROCEDURE — 96375 TX/PRO/DX INJ NEW DRUG ADDON: CPT | Mod: XU

## 2019-03-26 PROCEDURE — 97162 PT EVAL MOD COMPLEX 30 MIN: CPT

## 2019-03-26 PROCEDURE — 80307 DRUG TEST PRSMV CHEM ANLYZR: CPT

## 2019-03-26 PROCEDURE — 99285 EMERGENCY DEPT VISIT HI MDM: CPT | Mod: 25

## 2019-03-26 PROCEDURE — 85610 PROTHROMBIN TIME: CPT

## 2019-03-26 PROCEDURE — 73130 X-RAY EXAM OF HAND: CPT

## 2019-03-26 PROCEDURE — 90471 IMMUNIZATION ADMIN: CPT

## 2019-03-26 PROCEDURE — 73562 X-RAY EXAM OF KNEE 3: CPT

## 2019-03-26 PROCEDURE — 85027 COMPLETE CBC AUTOMATED: CPT

## 2019-03-26 PROCEDURE — 85730 THROMBOPLASTIN TIME PARTIAL: CPT

## 2019-03-26 PROCEDURE — 84295 ASSAY OF SERUM SODIUM: CPT

## 2019-03-26 PROCEDURE — 83735 ASSAY OF MAGNESIUM: CPT

## 2019-03-26 PROCEDURE — 86850 RBC ANTIBODY SCREEN: CPT

## 2019-03-26 PROCEDURE — 82947 ASSAY GLUCOSE BLOOD QUANT: CPT

## 2019-03-26 PROCEDURE — 86900 BLOOD TYPING SEROLOGIC ABO: CPT

## 2019-03-26 PROCEDURE — 73070 X-RAY EXAM OF ELBOW: CPT

## 2019-03-26 PROCEDURE — 96361 HYDRATE IV INFUSION ADD-ON: CPT | Mod: XU

## 2019-03-26 PROCEDURE — C1713: CPT

## 2019-03-26 PROCEDURE — 71260 CT THORAX DX C+: CPT

## 2019-03-26 PROCEDURE — 83690 ASSAY OF LIPASE: CPT

## 2019-03-26 PROCEDURE — 36000 PLACE NEEDLE IN VEIN: CPT | Mod: RT,XU

## 2019-03-26 PROCEDURE — 82330 ASSAY OF CALCIUM: CPT

## 2019-03-26 PROCEDURE — 84132 ASSAY OF SERUM POTASSIUM: CPT

## 2019-03-26 PROCEDURE — 73100 X-RAY EXAM OF WRIST: CPT

## 2019-03-26 PROCEDURE — 70486 CT MAXILLOFACIAL W/O DYE: CPT

## 2019-03-26 PROCEDURE — 90715 TDAP VACCINE 7 YRS/> IM: CPT

## 2019-03-26 PROCEDURE — 72170 X-RAY EXAM OF PELVIS: CPT

## 2019-03-26 PROCEDURE — 40650 RPR LIP FTH VERMILION ONLY: CPT

## 2019-03-26 PROCEDURE — 73110 X-RAY EXAM OF WRIST: CPT

## 2019-03-26 PROCEDURE — 82550 ASSAY OF CK (CPK): CPT

## 2019-03-26 PROCEDURE — 96374 THER/PROPH/DIAG INJ IV PUSH: CPT | Mod: XU

## 2019-03-26 RX ORDER — OXYCODONE HYDROCHLORIDE 5 MG/1
1 TABLET ORAL
Qty: 16 | Refills: 0 | OUTPATIENT
Start: 2019-03-26

## 2019-03-26 RX ORDER — ACETAMINOPHEN 500 MG
3 TABLET ORAL
Qty: 0 | Refills: 0 | COMMUNITY
Start: 2019-03-26

## 2019-03-26 RX ADMIN — LIDOCAINE 1 PATCH: 4 CREAM TOPICAL at 11:42

## 2019-03-26 RX ADMIN — GABAPENTIN 300 MILLIGRAM(S): 400 CAPSULE ORAL at 05:29

## 2019-03-26 RX ADMIN — Medication 975 MILLIGRAM(S): at 12:39

## 2019-03-26 RX ADMIN — Medication 100 MILLIGRAM(S): at 05:30

## 2019-03-26 RX ADMIN — TRAMADOL HYDROCHLORIDE 50 MILLIGRAM(S): 50 TABLET ORAL at 05:29

## 2019-03-26 RX ADMIN — TRAMADOL HYDROCHLORIDE 50 MILLIGRAM(S): 50 TABLET ORAL at 06:43

## 2019-03-26 RX ADMIN — Medication 975 MILLIGRAM(S): at 00:00

## 2019-03-26 RX ADMIN — Medication 975 MILLIGRAM(S): at 11:43

## 2019-03-26 RX ADMIN — TRAMADOL HYDROCHLORIDE 50 MILLIGRAM(S): 50 TABLET ORAL at 11:42

## 2019-03-26 RX ADMIN — LIDOCAINE 1 PATCH: 4 CREAM TOPICAL at 00:00

## 2019-03-26 RX ADMIN — Medication 975 MILLIGRAM(S): at 06:44

## 2019-03-26 RX ADMIN — Medication 975 MILLIGRAM(S): at 05:30

## 2019-03-26 RX ADMIN — TRAMADOL HYDROCHLORIDE 50 MILLIGRAM(S): 50 TABLET ORAL at 00:00

## 2019-03-26 RX ADMIN — TRAMADOL HYDROCHLORIDE 50 MILLIGRAM(S): 50 TABLET ORAL at 12:40

## 2019-03-26 NOTE — PROGRESS NOTE ADULT - SUBJECTIVE AND OBJECTIVE BOX
Post op Day [5 ]    Patient resting without complaints.  No chest pain, SOB, N/V.  Patient states has made great advances since yesterday, is hoping he can be possibly advanced to home.    T(C): 37.2 (03-26-19 @ 04:07), Max: 37.3 (03-25-19 @ 10:30)  HR: 67 (03-26-19 @ 04:07) (67 - 73)  BP: 116/75 (03-26-19 @ 04:07) (107/70 - 126/75)  RR: 17 (03-26-19 @ 04:07) (17 - 18)  SpO2: 93% (03-26-19 @ 04:07) (93% - 95%)  Wt(kg): --    Exam:  Alert and Oriented, No Acute Distress  Ecchymosis over R eye, lip injury well granulated without drainage  LUE in hinged brace with soft/compressible compartments over elbow, patient has intact splint on bilateral upper extremities.  Patient digits are pink, warm, mobile, SILT with brisk cap refill <2 seconds.  Calves Soft, Non-tender bilaterally                        10.4   7.18  )-----------( 127      ( 25 Mar 2019 08:57 )             32.5    03-25    136  |  98  |  16  ----------------------------<  96  3.7   |  27  |  0.78    Ca    9.0      25 Mar 2019 07:01  Phos  3.5     03-25  Mg     2.0     03-25

## 2019-03-26 NOTE — PROGRESS NOTE ADULT - PROBLEM SELECTOR PROBLEM 3
Maxillary fracture

## 2019-03-26 NOTE — PROGRESS NOTE ADULT - PROVIDER SPECIALTY LIST ADULT
Internal Medicine
Orthopedics
Plastic Surgery
Trauma Surgery
Orthopedics
Trauma Surgery
Internal Medicine
Internal Medicine

## 2019-03-26 NOTE — PROGRESS NOTE ADULT - PROBLEM SELECTOR PROBLEM 1
Rib fractures
Other closed intra-articular fracture of distal end of left radius, initial encounter
Radius fracture
Rib fractures

## 2019-03-26 NOTE — DISCHARGE NOTE NURSING/CASE MANAGEMENT/SOCIAL WORK - [email or web address]_GEN_ALL_CORE
You can access the [email or web address], offered by [facility], by registering with the following website: [email or web address]

## 2019-03-26 NOTE — PROGRESS NOTE ADULT - PROBLEM SELECTOR PLAN 4
DVT and GI prophylaxis  continue lovanox   eventual change to an oral agent
DVT and GI prophylaxis
DVT and GI prophylaxis  continue lovanox   eventual change to an oral agent

## 2019-03-26 NOTE — PROGRESS NOTE ADULT - PROBLEM SELECTOR PLAN 2
s/p Open reduction and internal fixation  tolerated procedure well  pain meds as needed

## 2019-03-26 NOTE — PROGRESS NOTE ADULT - ASSESSMENT
A/p: 63yMale s/p Level II trauma s/p fall off ladder/roof.   with bilateral distal radius fractures and L 4th distal Phalanx fx.  Is improved, Ortho Stable. VSS. NAD.

## 2019-03-26 NOTE — PROGRESS NOTE ADULT - ATTENDING COMMENTS
seen and examined 03-26-19 @ 0915    pain controlled with tramadol, Tylenol, gabapentin, oxycodone  able to feed himself with RUE and desires discharge home today    right 3-9 lateral minimally displaced rib fractures  left 4-9 anterolateral minimally displaced rib fractures  -pain control    right lip lacerations s/p repair by plastic surgery on 3/21  right ZMC fracture  bilateral nasal bone fractures  -outpatient f/u with plastic surgery    left elbow dislocation with ulna coronoid fracture s/p closed reduction on 3/21  -NWB in [place] brace  -outpatient f/u with Dr Donn Cavanaugh    s/p ORIF of left distal radius fracture on 3/21  -NWB in splint    left ring finger distal phalanx fracture / dislocation s/p closed reduction on 3/21  -outpatient f/u with Dr Reuben Bermudez    s/p washout / ORIF of grade 1 open right distal radius fracture on 3/21  -NWB in splint, but ok to use for feeding

## 2019-03-26 NOTE — PROGRESS NOTE ADULT - PROBLEM SELECTOR PLAN 3
pain management as needed
further treatment as per surgery
pain management as needed

## 2019-03-26 NOTE — PROGRESS NOTE ADULT - SUBJECTIVE AND OBJECTIVE BOX
63M who presents to [facility] with a c/o of b/l wrist pain and L elbow pain. Patient states he was helping his friend on a roof earlier today when he stepped backwards and fell 20 feet to the ground. Admits to headstrike and LOC as he is unable to fully recall the event. Level II trauma code initiated upon arrival to hospital. Denies any numbness or tingling. Admits to previous L arm surgery in 1957 but denies any other orthopedic surgeries. 63M w/ Grade 1 Open R DR Fx, L DR Fx, L Elbow Dislocation, and L 4th Digit Base of Distal Phalanx fracture. Left arm with stable edema, the past 24 hours     MEDICATIONS  (STANDING):  acetaminophen   Tablet .. 975 milliGRAM(s) Oral every 6 hours  docusate sodium 100 milliGRAM(s) Oral three times a day  enoxaparin Injectable 40 milliGRAM(s) SubCutaneous every 24 hours  gabapentin 300 milliGRAM(s) Oral three times a day  lidocaine   Patch 1 Patch Transdermal daily  lidocaine   Patch 1 Patch Transdermal daily  traMADol 50 milliGRAM(s) Oral every 6 hours    MEDICATIONS  (PRN):  bisacodyl Suppository 10 milliGRAM(s) Rectal daily PRN If no bowel movement by POD#2  HYDROmorphone  Injectable 1 milliGRAM(s) IV Push every 6 hours PRN breakthrough  magnesium hydroxide Suspension 30 milliLiter(s) Oral daily PRN Constipation  oxyCODONE    IR 10 milliGRAM(s) Oral every 4 hours PRN Severe Pain (7 - 10)  oxyCODONE    IR 5 milliGRAM(s) Oral every 4 hours PRN Moderate Pain (4 - 6)  sodium chloride 0.65% Nasal 1 Spray(s) Both Nostrils every 6 hours PRN Nasal Congestion          VITALS:   T(C): 36.8 (03-25-19 @ 17:43), Max: 37.3 (03-25-19 @ 10:30)  HR: 70 (03-25-19 @ 17:43) (69 - 77)  BP: 109/71 (03-25-19 @ 17:43) (106/67 - 136/84)  RR: 18 (03-25-19 @ 17:43) (18 - 18)  SpO2: 94% (03-25-19 @ 17:43) (93% - 95%)  Wt(kg): --    PHYSICAL EXAM:  GENERAL: NAD, well-groomed, well-developed  HEAD:  Atraumatic, Normocephalic  Facial trauma and orbital fractures  EYES: EOMI, PERRLA, conjunctiva and sclera clear  ENMT: No tonsillar erythema, exudates, or enlargement; Moist mucous membranes, Good dentition, No lesions  NECK: Supple, No JVD, Normal thyroid  NERVOUS SYSTEM:  Alert & Oriented X3, Good concentration; Motor Strength 5/5 B/L upper and lower extremities; DTRs 2+ intact and symmetric  CHEST/LUNG: Clear to percussion bilaterally; No rales, rhonchi, wheezing, or rubs  Bilateral rib fractures   HEART: Regular rate and rhythm; No murmurs, rubs, or gallops  ABDOMEN: Soft, Nontender, Nondistended; Bowel sounds present  EXTREMITIES:  2+ Peripheral Pulses, No clubbing, cyanosis, or edema elbow and finger fractrues  LYMPH: No lymphadenopathy noted  SKIN: No rashes or lesions    LABS:        CBC Full  -  ( 25 Mar 2019 08:57 )  WBC Count : 7.18 K/uL  Hemoglobin : 10.4 g/dL  Hematocrit : 32.5 %  Platelet Count - Automated : 127 K/uL  Mean Cell Volume : 94.2 fl  Mean Cell Hemoglobin : 30.1 pg  Mean Cell Hemoglobin Concentration : 32.0 gm/dL  Auto Neutrophil # : x  Auto Lymphocyte # : x  Auto Monocyte # : x  Auto Eosinophil # : x  Auto Basophil # : x  Auto Neutrophil % : x  Auto Lymphocyte % : x  Auto Monocyte % : x  Auto Eosinophil % : x  Auto Basophil % : x    03-25    136  |  98  |  16  ----------------------------<  96  3.7   |  27  |  0.78    Ca    9.0      25 Mar 2019 07:01  Phos  3.5     03-25  Mg     2.0     03-25            CAPILLARY BLOOD GLUCOSE          RADIOLOGY & ADDITIONAL TESTS: 63M who presents to [facility] with a c/o of b/l wrist pain and L elbow pain. Patient states he was helping his friend on a roof earlier today when he stepped backwards and fell 20 feet to the ground. Admits to headstrike and LOC as he is unable to fully recall the event. Level II trauma code initiated upon arrival to hospital. Denies any numbness or tingling. Admits to previous L arm surgery in 1957 but denies any other orthopedic surgeries. 63M w/ Grade 1 Open R DR Fx, L DR Fx, L Elbow Dislocation, and L 4th Digit Base of Distal Phalanx fracture. Left arm with stable edema, the past 24 hours     MEDICATIONS  (STANDING):  acetaminophen   Tablet .. 975 milliGRAM(s) Oral every 6 hours  docusate sodium 100 milliGRAM(s) Oral three times a day  enoxaparin Injectable 40 milliGRAM(s) SubCutaneous every 24 hours  gabapentin 300 milliGRAM(s) Oral three times a day  lidocaine   Patch 1 Patch Transdermal daily  lidocaine   Patch 1 Patch Transdermal daily  traMADol 50 milliGRAM(s) Oral every 6 hours    MEDICATIONS  (PRN):  bisacodyl Suppository 10 milliGRAM(s) Rectal daily PRN If no bowel movement by POD#2  HYDROmorphone  Injectable 1 milliGRAM(s) IV Push every 6 hours PRN breakthrough  magnesium hydroxide Suspension 30 milliLiter(s) Oral daily PRN Constipation  oxyCODONE    IR 10 milliGRAM(s) Oral every 4 hours PRN Severe Pain (7 - 10)  oxyCODONE    IR 5 milliGRAM(s) Oral every 4 hours PRN Moderate Pain (4 - 6)  sodium chloride 0.65% Nasal 1 Spray(s) Both Nostrils every 6 hours PRN Nasal Congestion    Vital Signs Last 24 Hrs  T(C): 36.9 (26 Mar 2019 08:37), Max: 37.3 (25 Mar 2019 21:30)  T(F): 98.4 (26 Mar 2019 08:37), Max: 99.1 (25 Mar 2019 21:30)  HR: 66 (26 Mar 2019 08:37) (66 - 73)  BP: 113/80 (26 Mar 2019 08:37) (107/70 - 116/75)  BP(mean): --  RR: 18 (26 Mar 2019 08:37) (17 - 18)  SpO2: 94% (26 Mar 2019 08:37) (93% - 94%)        PHYSICAL EXAM:  GENERAL: NAD, well-groomed, well-developed  HEAD:  Atraumatic, Normocephalic  Facial trauma and orbital fractures  EYES: EOMI, PERRLA, conjunctiva and sclera clear  ENMT: No tonsillar erythema, exudates, or enlargement; Moist mucous membranes, Good dentition, No lesions  NECK: Supple, No JVD, Normal thyroid  NERVOUS SYSTEM:  Alert & Oriented X3, Good concentration; Motor Strength 5/5 B/L upper and lower extremities; DTRs 2+ intact and symmetric  CHEST/LUNG: Clear to percussion bilaterally; No rales, rhonchi, wheezing, or rubs  Bilateral rib fractures   HEART: Regular rate and rhythm; No murmurs, rubs, or gallops  ABDOMEN: Soft, Nontender, Nondistended; Bowel sounds present  EXTREMITIES:  2+ Peripheral Pulses, No clubbing, cyanosis, or edema elbow and finger fractrues  LYMPH: No lymphadenopathy noted  SKIN: No rashes or lesions    LABS:            CBC Full  -  ( 26 Mar 2019 08:46 )  WBC Count : 7.66 K/uL  RBC Count : 3.28 M/uL  Hemoglobin : 10.1 g/dL  Hematocrit : 30.8 %  Platelet Count - Automated : 148 K/uL  Mean Cell Volume : 93.9 fl  Mean Cell Hemoglobin : 30.8 pg  Mean Cell Hemoglobin Concentration : 32.8 gm/dL  Auto Neutrophil # : x  Auto Lymphocyte # : x  Auto Monocyte # : x  Auto Eosinophil # : x  Auto Basophil # : x  Auto Neutrophil % : x  Auto Lymphocyte % : x  Auto Monocyte % : x  Auto Eosinophil % : x  Auto Basophil % : x    03-26    135  |  97  |  18  ----------------------------<  97  3.9   |  25  |  0.79    Ca    9.2      26 Mar 2019 07:26  Phos  3.8     03-26  Mg     1.8     03-26 63M who presents to [facility] with a c/o of b/l wrist pain and L elbow pain. Patient states he was helping his friend on a roof earlier today when he stepped backwards and fell 20 feet to the ground. Admits to headstrike and LOC as he is unable to fully recall the event. Level II trauma code initiated upon arrival to hospital. Denies any numbness or tingling. Admits to previous L arm surgery in 1957 but denies any other orthopedic surgeries. 63M w/ Grade 1 Open R DR Fx, L DR Fx, L Elbow Dislocation, and L 4th Digit Base of Distal Phalanx fracture. Left arm with decreasing edema, the past 24 hours and the patient has been able to ambulate with two person assist.    MEDICATIONS  (STANDING):  acetaminophen   Tablet .. 975 milliGRAM(s) Oral every 6 hours  docusate sodium 100 milliGRAM(s) Oral three times a day  enoxaparin Injectable 40 milliGRAM(s) SubCutaneous every 24 hours  gabapentin 300 milliGRAM(s) Oral three times a day  lidocaine   Patch 1 Patch Transdermal daily  lidocaine   Patch 1 Patch Transdermal daily  traMADol 50 milliGRAM(s) Oral every 6 hours    MEDICATIONS  (PRN):  bisacodyl Suppository 10 milliGRAM(s) Rectal daily PRN If no bowel movement by POD#2  HYDROmorphone  Injectable 1 milliGRAM(s) IV Push every 6 hours PRN breakthrough  magnesium hydroxide Suspension 30 milliLiter(s) Oral daily PRN Constipation  oxyCODONE    IR 10 milliGRAM(s) Oral every 4 hours PRN Severe Pain (7 - 10)  oxyCODONE    IR 5 milliGRAM(s) Oral every 4 hours PRN Moderate Pain (4 - 6)  sodium chloride 0.65% Nasal 1 Spray(s) Both Nostrils every 6 hours PRN Nasal Congestion    Vital Signs Last 24 Hrs  T(C): 36.9 (26 Mar 2019 08:37), Max: 37.3 (25 Mar 2019 21:30)  T(F): 98.4 (26 Mar 2019 08:37), Max: 99.1 (25 Mar 2019 21:30)  HR: 66 (26 Mar 2019 08:37) (66 - 73)  BP: 113/80 (26 Mar 2019 08:37) (107/70 - 116/75)  BP(mean): --  RR: 18 (26 Mar 2019 08:37) (17 - 18)  SpO2: 94% (26 Mar 2019 08:37) (93% - 94%)        PHYSICAL EXAM:  GENERAL: NAD, well-groomed, well-developed  HEAD:  Atraumatic, Normocephalic  Facial trauma and orbital fractures  EYES: EOMI, PERRLA, conjunctiva and sclera clear  ENMT: No tonsillar erythema, exudates, or enlargement; Moist mucous membranes, Good dentition, No lesions  NECK: Supple, No JVD, Normal thyroid  NERVOUS SYSTEM:  Alert & Oriented X3, Good concentration; Motor Strength 5/5 B/L upper and lower extremities; DTRs 2+ intact and symmetric  CHEST/LUNG: Clear to percussion bilaterally; No rales, rhonchi, wheezing, or rubs  Bilateral rib fractures   HEART: Regular rate and rhythm; No murmurs, rubs, or gallops  ABDOMEN: Soft, Nontender, Nondistended; Bowel sounds present  EXTREMITIES:  2+ Peripheral Pulses, No clubbing, cyanosis, or edema elbow and finger fractrues  LYMPH: No lymphadenopathy noted  SKIN: No rashes or lesions    LABS:            CBC Full  -  ( 26 Mar 2019 08:46 )  WBC Count : 7.66 K/uL  RBC Count : 3.28 M/uL  Hemoglobin : 10.1 g/dL  Hematocrit : 30.8 %  Platelet Count - Automated : 148 K/uL  Mean Cell Volume : 93.9 fl  Mean Cell Hemoglobin : 30.8 pg  Mean Cell Hemoglobin Concentration : 32.8 gm/dL  Auto Neutrophil # : x  Auto Lymphocyte # : x  Auto Monocyte # : x  Auto Eosinophil # : x  Auto Basophil # : x  Auto Neutrophil % : x  Auto Lymphocyte % : x  Auto Monocyte % : x  Auto Eosinophil % : x  Auto Basophil % : x    03-26    135  |  97  |  18  ----------------------------<  97  3.9   |  25  |  0.79    Ca    9.2      26 Mar 2019 07:26  Phos  3.8     03-26  Mg     1.8     03-26

## 2019-03-26 NOTE — DISCHARGE NOTE PROVIDER - HOSPITAL COURSE
63 year old male presenting as Level II trauma activation following fall backwards off a roof. Patient reports he was working on his friend's roof, trying to address a leak when he fell off the roof, approximately 20 feet, and seems to have struck his face on the bottom of the ladder, however, he had (+) LOC and is uncertain of the details of the event. He was brought in by EMS with bilateral upper extremity splints and c-collar in place. Patient is reporting pain in both arms and his chest; moving all extremities and able to pull 2.5L on incentive spirometry.        Primary Survey intact, GCS 15    Secondary Survey significant for bilateral upper extremity deformities, tenderness over R chest        Mechanism: 63 year old male presenting as Level II trauma activation following fall backwards off a roof. Patient reports he was working on his friend's roof, trying to address a leak when he fell off the roof, approximately 20 feet, and seems to have struck his face on the bottom of the ladder, however, he had (+) LOC and is uncertain of the details of the event. He was brought in by EMS with bilateral upper extremity splints and c-collar in place. Patient is reporting pain in both arms and his chest; moving all extremities and able to pull 2.5L on incentive spirometry.            Patient was seen by PRS for right ZMC and nasal bone fractures No plastic surgery intervention for fractures. Pt to follow up with Dr. Fernando as outpatient.         Patient underwent a ORIF B/L distal radius fractures, closed reduction L 4th distal phalanx fracture in the OR. L hinged elbow brace was placed. The patient tolerated the procedure well. Postoperatively the patient was sent to the PACU. The patient was hemodynamically stable and was transferred to a surgical floor. The patient's pain was controlled by IV pain medications and then by PO pain medications. The patient was advanced to a regular diet and tolerated it well. The patient was placed on home medications. PT evaluated the patient and recommended home PT and home OT.         At the time of discharge, the patient was hemodynamically stable, was tolerating PO diet, was voiding urine and had normal GI function, was ambulating, and was comfortable with adequate pain control. The patient was instructed to follow up with Dr. Bermudez    within after discharge from the hospital. The patient will follow up with Dr. Bermudez of orthopedics and Dr. Fernando of PRS for his facial fractures. The patient can follow up with Dr. Bone as needed. The patient felt comfortable with discharge. The patient was discharged with a prescription for oxycodone and recommendations for OTC pain medications. The patient had no other issues.

## 2019-03-26 NOTE — PROGRESS NOTE ADULT - ASSESSMENT
63 year old male s/p fall from 20 feet, hemodynamically stable, with multiple facial fracture, bilateral distal radius fractures, and bilateral rib fractures (R 5-9 and L 4-6).    - multimodal analgesia with tylenol, tramadol, oxycodone, dilaudid and lidoderm  - PT/OT-->home with home PT and OT  - appreciate Ophtho recs - f/u as outpatient  - appreciate Ortho recs/plan: c/w ACE wraps and brace and f/u as outpatient  - f/u plastics, - bacitracin to lip suture lines BID-no plastic surgery intervention for fractures at this time  - lovenox for DVT ppx  - OOB and ambulating as tolerated  - likely d/c today    ACS Surgery  [phone]

## 2019-03-26 NOTE — PROGRESS NOTE ADULT - ATTENDING COMMENTS
Fell off a ladder 20 feet with facial, left > than right arm injuries and multiple rib fractures. Remain alert and responsive and is taking limited narcotics, to prevent excessive sedation.  Grade 1 Open R DR Fx, L DR Fx, L Elbow Dislocation, and L 4th Digit Base of Distal Phalanx fracture. Left arm with increasing edema. No medical complications post-op to date.  Continues pulmonary toilet to lessen atelectasis, leg exercises as taught to lessen the risk of DVT and supervised pain medications for post-op pain control. I am a non participating [facility] physician seeing Pt in coverage for Dr Zarate. The above patient examination was reviewed with Dr. Wu and I agree with his evaluation, assessment and treatment plan.  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 Remain alert and responsive and is taking limited narcotics, to prevent excessive sedation.  Grade 1 Open R DR Fx, L DR Fx, L Elbow Dislocation, and L 4th Digit Base of Distal Phalanx fracture. Left arm with increasing edema. No medical complications post-op to date.  Continues pulmonary toilet to lessen atelectasis, leg exercises as taught to lessen the risk of DVT and supervised pain medications for post-op pain control.  Left arm with decreasing edema, the past 24 hours and the patient has been able to ambulate with two person assist. Cleared by orthopedics for discharge to home today.  Full instruction provided to patient regarding diagnosis, treatment, discharge medications, diet and follow up care. Medically stable and for discharge to home today as planned.

## 2019-03-26 NOTE — PROGRESS NOTE ADULT - REASON FOR ADMISSION
Level II - fall off roof
Level II - fall off roof/ladder
Level II - fall off roof

## 2019-03-26 NOTE — PROGRESS NOTE ADULT - PROBLEM SELECTOR PLAN 1
continue incentive spirometry  pulmonary toilet  pain meds as needed
***See Above  Caden DAWSON  Orthopedics  B: [phone]  S: [phone]
PT/OT-NWB B/L UE's, L hinged elbow brace  IS  DVT PPx  Pain Control  Continue Current Tx.  Plan per primary team  Dispo planning    Ramesh Schwab PA-C  Team Pager: #[phone]
continue incentive spirometry  pulmonary toilet  pain meds as needed

## 2019-03-26 NOTE — PROGRESS NOTE ADULT - SUBJECTIVE AND OBJECTIVE BOX
Surgery Progress Note    S: Patient seen and examined. No acute events overnight. Awaiting insurance auth for rehab vs home with home services. Pain is well controlled. No new complaints this AM.     O:  Vital Signs Last 24 Hrs  T(C): 36.9 (26 Mar 2019 08:37), Max: 37.3 (25 Mar 2019 10:30)  T(F): 98.4 (26 Mar 2019 08:37), Max: 99.1 (25 Mar 2019 10:30)  HR: 66 (26 Mar 2019 08:37) (66 - 73)  BP: 113/80 (26 Mar 2019 08:37) (107/70 - 126/75)  BP(mean): --  RR: 18 (26 Mar 2019 08:37) (17 - 18)  SpO2: 94% (26 Mar 2019 08:37) (93% - 95%)    I&O's Detail    25 Mar 2019 07:01  -  26 Mar 2019 07:00  --------------------------------------------------------  IN:    Oral Fluid: 1100 mL  Total IN: 1100 mL    OUT:  Total OUT: 0 mL    Total NET: 1100 mL          MEDICATIONS  (STANDING):  acetaminophen   Tablet .. 975 milliGRAM(s) Oral every 6 hours  docusate sodium 100 milliGRAM(s) Oral three times a day  enoxaparin Injectable 40 milliGRAM(s) SubCutaneous every 24 hours  gabapentin 300 milliGRAM(s) Oral three times a day  lidocaine   Patch 1 Patch Transdermal daily  lidocaine   Patch 1 Patch Transdermal daily  traMADol 50 milliGRAM(s) Oral every 6 hours    MEDICATIONS  (PRN):  bisacodyl Suppository 10 milliGRAM(s) Rectal daily PRN If no bowel movement by POD#2  HYDROmorphone  Injectable 1 milliGRAM(s) IV Push every 6 hours PRN breakthrough  magnesium hydroxide Suspension 30 milliLiter(s) Oral daily PRN Constipation  oxyCODONE    IR 10 milliGRAM(s) Oral every 4 hours PRN Severe Pain (7 - 10)  oxyCODONE    IR 5 milliGRAM(s) Oral every 4 hours PRN Moderate Pain (4 - 6)  sodium chloride 0.65% Nasal 1 Spray(s) Both Nostrils every 6 hours PRN Nasal Congestion                            10.1   7.66  )-----------( 148      ( 26 Mar 2019 08:46 )             30.8       03-26    135  |  97  |  18  ----------------------------<  97  3.9   |  25  |  0.79    Ca    9.2      26 Mar 2019 07:26  Phos  3.8     03-26  Mg     1.8     03-26        Physical Exam:  Gen: Laying in bed, NAD, multiple facial ecchymoses present, R eye swollen  Resp: Unlabored breathing  Abd: soft, NTND, no rebound or guarding  Ext: WWP, ACE wrap present on R arm, L arm/elbow in brace, L shoulder in sling, good distal sensory and motor b/l, L 4th finger in splint, strongly palpable radial pulse b/l

## 2019-03-26 NOTE — DISCHARGE NOTE PROVIDER - NSDCCPCAREPLAN_GEN_ALL_CORE_FT
PRINCIPAL DISCHARGE DIAGNOSIS  Diagnosis: Other closed intra-articular fracture of distal end of left radius, initial encounter  Assessment and Plan of Treatment: Pain well controlled. Tolerating PO diet. Urine output appropriate. Ambulating. Stable for discharge.   HOME CARE: Home PT and Home OT.   WOUND CARE:  Please keep incisions clean and dry. Please do not Scrub or rub incisions. Do not use lotion or powder on incisions.   BATHING: Please do not submerge wound underwater. You may shower and/or sponge bathe.  ACTIVITY: No heavy lifting or straining until your follow up appointment. Otherwise, you may return to your usual level of physical activity. If you are taking narcotic pain medication (such as Percocet) DO NOT drive a car, operate machinery or make important decisions.  DIET: Return to your usual diet.  NOTIFY YOUR SURGEON IF: You have any bleeding that does not stop, any pus draining from your wound(s), any fever (over 100.4 F) or chills, persistent nausea/vomiting, persistent diarrhea, or if your pain is not controlled on your discharge pain medications.  FOLLOW-UP:  1. Please follow up with your orthopedic surgeon Dr. Bermudez. Please call his office after discharge to make your appointment.   2. Please follow up with your plastic surgeon Dr. Fernando on Tuesday 3/26/19. If you can't make this appointment call [phone] to schedule an appointment as soon as you are discharged.   3. You can follow up with your trauma surgeron Dr. Bone as needed after discharge. Please may call the office if you need an appointment.        SECONDARY DISCHARGE DIAGNOSES  Diagnosis: Closed fracture of multiple ribs of both sides, initial encounter  Assessment and Plan of Treatment: Pain well controlled. Tolerating PO diet. Urine output appropriate. Ambulating. Stable for discharge. Follow up with Dr. Fernando of plastic surgery.    Diagnosis: Maxillary fracture  Assessment and Plan of Treatment: Pain well controlled. Tolerating PO diet. Urine output appropriate. Ambulating. Stable for discharge. Follow up with Dr. Fernando of plastic surgery.    Diagnosis: Radius fracture  Assessment and Plan of Treatment: Pain well controlled. Tolerating PO diet. Urine output appropriate. Ambulating. Stable for discharge. Follow up with orthopedic surgery.

## 2019-03-26 NOTE — DISCHARGE NOTE PROVIDER - CARE PROVIDER_API CALL
Reuben Bermudez)  Orthopaedic Surgery; Surgery of the Hand  [address] [place] [address], [address]  [place] [address]  Phone: [phone]  Fax: [phone]  Follow Up Time:     Angelito Bone)  Surgery; Surgical Critical Care  [address] [place] [id number]  Phone: [phone]  Fax: [phone]  Follow Up Time:

## 2020-06-17 NOTE — PROGRESS NOTE ADULT - PROBLEM SELECTOR PROBLEM 4
DVT (deep venous thrombosis)
Orthopedics
Anticoag Management
DVT (deep venous thrombosis)
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
## 2020-12-10 ENCOUNTER — APPOINTMENT (OUTPATIENT)
Dept: CT IMAGING | Facility: CLINIC | Age: 65
End: 2020-12-10
Payer: COMMERCIAL

## 2020-12-10 ENCOUNTER — APPOINTMENT (OUTPATIENT)
Dept: CT IMAGING | Facility: CLINIC | Age: 65
End: 2020-12-10

## 2020-12-10 ENCOUNTER — RESULT REVIEW (OUTPATIENT)
Age: 65
End: 2020-12-10

## 2020-12-10 PROCEDURE — 82565A: CUSTOM | Mod: QW

## 2020-12-10 PROCEDURE — 74177 CT ABD & PELVIS W/CONTRAST: CPT

## 2020-12-10 PROCEDURE — Q9967E: CUSTOM

## 2020-12-11 ENCOUNTER — APPOINTMENT (OUTPATIENT)
Dept: COLORECTAL SURGERY | Facility: CLINIC | Age: 65
End: 2020-12-11

## 2022-03-09 NOTE — ED ADULT NURSE REASSESSMENT NOTE - NS ED NURSE REASSESS COMMENT FT1
04:25. Plastics at bedside suturing lacerations present on lips at this time. 3/9/2022 2:15 PM    Ms. [address] [address]      PE Note       To Whom It May Concern:    Renato Mills is currently under the care of [facility]. She will avoid activities with the right knee (if in pain) for 3 weeks. If there are questions or concerns please have the patient contact our office.           Sincerely,      Elijah Castañeda NP

## 2022-08-26 ENCOUNTER — APPOINTMENT (OUTPATIENT)
Dept: ELECTROPHYSIOLOGY | Facility: CLINIC | Age: 67
End: 2022-08-26

## 2022-08-26 VITALS
TEMPERATURE: 97.3 F | HEIGHT: 73 IN | HEART RATE: 58 BPM | BODY MASS INDEX: 30.75 KG/M2 | WEIGHT: 232 LBS | SYSTOLIC BLOOD PRESSURE: 90 MMHG | OXYGEN SATURATION: 96 % | DIASTOLIC BLOOD PRESSURE: 70 MMHG

## 2022-08-26 DIAGNOSIS — I48.91 UNSPECIFIED ATRIAL FIBRILLATION: ICD-10-CM

## 2022-08-26 DIAGNOSIS — E78.00 PURE HYPERCHOLESTEROLEMIA, UNSPECIFIED: ICD-10-CM

## 2022-08-26 PROCEDURE — 93000 ELECTROCARDIOGRAM COMPLETE: CPT

## 2022-08-26 PROCEDURE — 99203 OFFICE O/P NEW LOW 30 MIN: CPT | Mod: 25

## 2022-08-26 RX ORDER — NYSTATIN 100000 1/G
100000 POWDER TOPICAL TWICE DAILY
Qty: 60 | Refills: 2 | Status: DISCONTINUED | COMMUNITY
Start: 2017-11-29 | End: 2022-08-26

## 2022-09-07 ENCOUNTER — OUTPATIENT (OUTPATIENT)
Dept: OUTPATIENT SERVICES | Facility: HOSPITAL | Age: 67
LOS: 1 days | End: 2022-09-07
Payer: COMMERCIAL

## 2022-09-07 DIAGNOSIS — G47.33 OBSTRUCTIVE SLEEP APNEA (ADULT) (PEDIATRIC): ICD-10-CM

## 2022-09-07 DIAGNOSIS — Z94.7 CORNEAL TRANSPLANT STATUS: Chronic | ICD-10-CM

## 2022-09-07 DIAGNOSIS — S42.302A UNSPECIFIED FRACTURE OF SHAFT OF HUMERUS, LEFT ARM, INITIAL ENCOUNTER FOR CLOSED FRACTURE: Chronic | ICD-10-CM

## 2022-09-07 PROCEDURE — 95800 SLP STDY UNATTENDED: CPT

## 2022-09-11 ENCOUNTER — NON-APPOINTMENT (OUTPATIENT)
Age: 67
End: 2022-09-11

## 2022-09-12 NOTE — HISTORY OF PRESENT ILLNESS
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 Rob Mendelson.  Patient practiced in [place] and now is residing on the [facility].\par \par He was referred for evaluation and management of his atrial fibrillation.\par \par He is healthy 67-year-old with no prior history of hypertension diabetes thyroid disease, stroke or TIA.  He has had a history of mild to moderate GERD.\par \par The patient was doing well until he unfortunately contracted COVID December 2020.  Patient was fairly sick - chest studies showing ground glass opacity.  He was given intravenous monoclonal antibody.  Patient subsequently developed atrial fibrillation a few months later.  He had presented to the emergency room and was given intravenous medication and before completion of the dosage he had converted spontaneously to sinus rhythm.\par \par The patient developed a second episode of atrial fibrillation in approximately a week and a half ago.  He had taken some diltiazem and within the timeframe of about 2 hours from onset it had terminated.  Due to situations in which the A. fib occurred seem to occur at night when he was waking up.  It was also preceded with him being on vacation and with  increasing alcohol intake as well.\par \par Reviewed the patient report on the CT angiogram done the ascending aorta was 4.4 cm.   There were scattered ground glass nonspecific opacities that probably is a result of his prior COVID infection.  There was mild coronary calcification of the left main, circumflex with a calcium score of  22.\par \par \par \par \par \par \par

## 2022-09-12 NOTE — CARDIOLOGY SUMMARY
[de-identified] : Sinus  Bradycardia \par -Nonspecific QRS widening and anterior fascicular block.

## 2022-09-12 NOTE — PHYSICAL EXAM
[No Acute Distress] : no acute distress [Normal Conjunctiva] : normal conjunctiva [Normal Venous Pressure] : normal venous pressure [Normal S1, S2] : normal S1, S2 [Clear Lung Fields] : clear lung fields [Non Tender] : non-tender [Normal Gait] : normal gait [No Edema] : no edema [No Cyanosis] : no cyanosis [No Clubbing] : no clubbing [Normal PT B/L] : normal PT B/L [No Rash] : no rash [No Focal Deficits] : no focal deficits [Alert and Oriented] : alert and oriented

## 2022-09-12 NOTE — DISCUSSION/SUMMARY
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  These trigger seems modifiable.\par Is echocardiogram from 3/18/2021 showed EF 60%, left ventricular internal diastolic diameter 5.6 to 5.5 cm left atrium is 3.6 cm diameter with left atrial volume index 24.1 cc per metered square.  The right ventricular systolic volume and pressure were normal.\par \par His examination today showed that his blood pressure was on the low side 90/60.  Cardiac examination was unremarkable.   Extremity on his left side he had a prior DVT he has some residual swelling without any edema.  On the right it was normal.  His posterior tibialis pulses 1+ on the right but not palpable on the left.\par [id number] score: age 67. \par AF risk factors: age, sleep apnea, GERD\par Bleeding risk: Low\par Our management plan for his atrial fibrillation will be to modify his trigger and obtain a sleep study.  The sleep study was ordered.  Reviewed prior thyroid function test and stress test.\par \par Follow-up evaluation electrophysiology within 4 months. [EKG obtained to assist in diagnosis and management of assessed problem(s)] : EKG obtained to assist in diagnosis and management of assessed problem(s)

## 2022-09-12 NOTE — REVIEW OF SYSTEMS
[Feeling Fatigued] : not feeling fatigued [Blurry Vision] : no blurred vision [SOB] : no shortness of breath [Orthopnea] : no orthopnea [Syncope] : no syncope [Cough] : no cough [Abdominal Pain] : no abdominal pain [Rash] : no rash [Dizziness] : no dizziness [Easy Bleeding] : no tendency for easy bleeding

## 2023-09-11 NOTE — PHYSICAL THERAPY INITIAL EVALUATION ADULT - FOLLOWS COMMANDS/ANSWERS QUESTIONS, REHAB EVAL
This message is being sent by Anh Martinez on behalf of Abhishek Bhatti.     Just FYI, I have to provide 2 different boxes now for Raph due to the new after school program  hence the refill request. Thank you!  
100% of the time

## 2023-12-04 NOTE — PATIENT PROFILE ADULT - HAS THE PATIENT EXPERIENCED ANY OF THE FOLLOWING WITHIN THE WEEK PRIOR TO ADMISSION?
----- Message from Karyn Amos CMA sent at 11/20/2023 11:10 AM CST -----  Regarding: BP readings  Please reach out to patient in regards to patients BP- patient will be collecting BP for 2 weeks at home.     fall/orthopedic trauma/surgery

## 2025-02-23 NOTE — ED ADULT NURSE NOTE - CAS DISCH ACCOMP BY
Body mass index is 38.26 kg/m². Morbid obesity complicates all aspects of disease management from diagnostic modalities to treatment. Weight loss encouraged and health benefits explained to patient.        Self/Family